# Patient Record
Sex: FEMALE | Race: WHITE | HISPANIC OR LATINO | Employment: UNEMPLOYED | URBAN - METROPOLITAN AREA
[De-identification: names, ages, dates, MRNs, and addresses within clinical notes are randomized per-mention and may not be internally consistent; named-entity substitution may affect disease eponyms.]

---

## 2021-12-29 ENCOUNTER — APPOINTMENT (OUTPATIENT)
Dept: RADIOLOGY | Facility: MEDICAL CENTER | Age: 56
DRG: 177 | End: 2021-12-29
Attending: EMERGENCY MEDICINE
Payer: OTHER GOVERNMENT

## 2021-12-29 ENCOUNTER — HOSPITAL ENCOUNTER (INPATIENT)
Facility: MEDICAL CENTER | Age: 56
LOS: 3 days | DRG: 177 | End: 2022-01-01
Attending: EMERGENCY MEDICINE | Admitting: HOSPITALIST
Payer: OTHER GOVERNMENT

## 2021-12-29 DIAGNOSIS — R06.02 SHORTNESS OF BREATH: ICD-10-CM

## 2021-12-29 DIAGNOSIS — I26.99 OTHER ACUTE PULMONARY EMBOLISM, UNSPECIFIED WHETHER ACUTE COR PULMONALE PRESENT (HCC): ICD-10-CM

## 2021-12-29 DIAGNOSIS — U07.1 PNEUMONIA DUE TO COVID-19 VIRUS: ICD-10-CM

## 2021-12-29 DIAGNOSIS — J96.01 ACUTE RESPIRATORY FAILURE WITH HYPOXIA (HCC): ICD-10-CM

## 2021-12-29 DIAGNOSIS — J12.82 PNEUMONIA DUE TO COVID-19 VIRUS: ICD-10-CM

## 2021-12-29 DIAGNOSIS — N64.89 BREAST HEMATOMA: ICD-10-CM

## 2021-12-29 PROBLEM — R73.09 ELEVATED GLUCOSE: Status: ACTIVE | Noted: 2021-12-29

## 2021-12-29 PROBLEM — Z98.890 HISTORY OF COSMETIC SURGERY: Status: ACTIVE | Noted: 2021-12-29

## 2021-12-29 LAB
ALBUMIN SERPL BCP-MCNC: 3.7 G/DL (ref 3.2–4.9)
ALBUMIN/GLOB SERPL: 1.1 G/DL
ALP SERPL-CCNC: 62 U/L (ref 30–99)
ALT SERPL-CCNC: 28 U/L (ref 2–50)
ANION GAP SERPL CALC-SCNC: 11 MMOL/L (ref 7–16)
AST SERPL-CCNC: 31 U/L (ref 12–45)
BASOPHILS # BLD AUTO: 1.2 % (ref 0–1.8)
BASOPHILS # BLD: 0.07 K/UL (ref 0–0.12)
BILIRUB SERPL-MCNC: 0.4 MG/DL (ref 0.1–1.5)
BLOOD CULTURE HOLD CXBCH: NORMAL
BUN SERPL-MCNC: 14 MG/DL (ref 8–22)
CALCIUM SERPL-MCNC: 9 MG/DL (ref 8.5–10.5)
CHLORIDE SERPL-SCNC: 102 MMOL/L (ref 96–112)
CO2 SERPL-SCNC: 24 MMOL/L (ref 20–33)
CREAT SERPL-MCNC: 0.83 MG/DL (ref 0.5–1.4)
D DIMER PPP IA.FEU-MCNC: 8.01 UG/ML (FEU) (ref 0–0.5)
EKG IMPRESSION: NORMAL
EOSINOPHIL # BLD AUTO: 0.07 K/UL (ref 0–0.51)
EOSINOPHIL NFR BLD: 1.2 % (ref 0–6.9)
ERYTHROCYTE [DISTWIDTH] IN BLOOD BY AUTOMATED COUNT: 51.5 FL (ref 35.9–50)
FLUAV RNA SPEC QL NAA+PROBE: NEGATIVE
FLUBV RNA SPEC QL NAA+PROBE: NEGATIVE
GLOBULIN SER CALC-MCNC: 3.5 G/DL (ref 1.9–3.5)
GLUCOSE SERPL-MCNC: 119 MG/DL (ref 65–99)
HCT VFR BLD AUTO: 29.4 % (ref 37–47)
HGB BLD-MCNC: 9.1 G/DL (ref 12–16)
IMM GRANULOCYTES # BLD AUTO: 0.06 K/UL (ref 0–0.11)
IMM GRANULOCYTES NFR BLD AUTO: 1 % (ref 0–0.9)
LACTATE BLD-SCNC: 1.3 MMOL/L (ref 0.5–2)
LACTATE BLD-SCNC: 1.6 MMOL/L (ref 0.5–2)
LYMPHOCYTES # BLD AUTO: 0.87 K/UL (ref 1–4.8)
LYMPHOCYTES NFR BLD: 14.6 % (ref 22–41)
MCH RBC QN AUTO: 27.7 PG (ref 27–33)
MCHC RBC AUTO-ENTMCNC: 31 G/DL (ref 33.6–35)
MCV RBC AUTO: 89.4 FL (ref 81.4–97.8)
MONOCYTES # BLD AUTO: 0.6 K/UL (ref 0–0.85)
MONOCYTES NFR BLD AUTO: 10.1 % (ref 0–13.4)
NEUTROPHILS # BLD AUTO: 4.3 K/UL (ref 2–7.15)
NEUTROPHILS NFR BLD: 71.9 % (ref 44–72)
NRBC # BLD AUTO: 0.02 K/UL
NRBC BLD-RTO: 0.3 /100 WBC
NT-PROBNP SERPL IA-MCNC: 210 PG/ML (ref 0–125)
PLATELET # BLD AUTO: 294 K/UL (ref 164–446)
PMV BLD AUTO: 8.8 FL (ref 9–12.9)
POTASSIUM SERPL-SCNC: 4.5 MMOL/L (ref 3.6–5.5)
PROT SERPL-MCNC: 7.2 G/DL (ref 6–8.2)
RBC # BLD AUTO: 3.29 M/UL (ref 4.2–5.4)
RSV RNA SPEC QL NAA+PROBE: NEGATIVE
SARS-COV-2 RNA RESP QL NAA+PROBE: DETECTED
SODIUM SERPL-SCNC: 137 MMOL/L (ref 135–145)
SPECIMEN SOURCE: ABNORMAL
TROPONIN T SERPL-MCNC: 19 NG/L (ref 6–19)
WBC # BLD AUTO: 6 K/UL (ref 4.8–10.8)

## 2021-12-29 PROCEDURE — 99223 1ST HOSP IP/OBS HIGH 75: CPT | Performed by: HOSPITALIST

## 2021-12-29 PROCEDURE — 71045 X-RAY EXAM CHEST 1 VIEW: CPT

## 2021-12-29 PROCEDURE — 80053 COMPREHEN METABOLIC PANEL: CPT

## 2021-12-29 PROCEDURE — 0241U HCHG SARS-COV-2 COVID-19 NFCT DS RESP RNA 4 TRGT MIC: CPT

## 2021-12-29 PROCEDURE — 85379 FIBRIN DEGRADATION QUANT: CPT

## 2021-12-29 PROCEDURE — 83880 ASSAY OF NATRIURETIC PEPTIDE: CPT

## 2021-12-29 PROCEDURE — 83605 ASSAY OF LACTIC ACID: CPT | Mod: 91

## 2021-12-29 PROCEDURE — 85025 COMPLETE CBC W/AUTO DIFF WBC: CPT

## 2021-12-29 PROCEDURE — 700117 HCHG RX CONTRAST REV CODE 255: Performed by: EMERGENCY MEDICINE

## 2021-12-29 PROCEDURE — C9803 HOPD COVID-19 SPEC COLLECT: HCPCS | Performed by: EMERGENCY MEDICINE

## 2021-12-29 PROCEDURE — 36415 COLL VENOUS BLD VENIPUNCTURE: CPT

## 2021-12-29 PROCEDURE — 93005 ELECTROCARDIOGRAM TRACING: CPT | Performed by: EMERGENCY MEDICINE

## 2021-12-29 PROCEDURE — 770020 HCHG ROOM/CARE - TELE (206)

## 2021-12-29 PROCEDURE — 87040 BLOOD CULTURE FOR BACTERIA: CPT

## 2021-12-29 PROCEDURE — 71275 CT ANGIOGRAPHY CHEST: CPT

## 2021-12-29 PROCEDURE — 84145 PROCALCITONIN (PCT): CPT

## 2021-12-29 PROCEDURE — 99285 EMERGENCY DEPT VISIT HI MDM: CPT

## 2021-12-29 PROCEDURE — 85610 PROTHROMBIN TIME: CPT

## 2021-12-29 PROCEDURE — 86140 C-REACTIVE PROTEIN: CPT

## 2021-12-29 PROCEDURE — 84484 ASSAY OF TROPONIN QUANT: CPT

## 2021-12-29 RX ORDER — CEPHALEXIN 500 MG/1
500 CAPSULE ORAL 4 TIMES DAILY
COMMUNITY

## 2021-12-29 RX ORDER — AMOXICILLIN 250 MG
2 CAPSULE ORAL 2 TIMES DAILY
Status: DISCONTINUED | OUTPATIENT
Start: 2021-12-29 | End: 2022-01-01 | Stop reason: HOSPADM

## 2021-12-29 RX ORDER — LISINOPRIL 10 MG/1
10 TABLET ORAL DAILY
COMMUNITY

## 2021-12-29 RX ORDER — CEFTRIAXONE 2 G/1
2 INJECTION, POWDER, FOR SOLUTION INTRAMUSCULAR; INTRAVENOUS ONCE
Status: COMPLETED | OUTPATIENT
Start: 2021-12-29 | End: 2021-12-30

## 2021-12-29 RX ORDER — ACETAMINOPHEN 500 MG
1000 TABLET ORAL
COMMUNITY

## 2021-12-29 RX ORDER — ONDANSETRON 2 MG/ML
4 INJECTION INTRAMUSCULAR; INTRAVENOUS EVERY 4 HOURS PRN
Status: DISCONTINUED | OUTPATIENT
Start: 2021-12-29 | End: 2022-01-01 | Stop reason: HOSPADM

## 2021-12-29 RX ORDER — ONDANSETRON 4 MG/1
4 TABLET, ORALLY DISINTEGRATING ORAL EVERY 4 HOURS PRN
Status: DISCONTINUED | OUTPATIENT
Start: 2021-12-29 | End: 2022-01-01 | Stop reason: HOSPADM

## 2021-12-29 RX ORDER — BISACODYL 10 MG
10 SUPPOSITORY, RECTAL RECTAL
Status: DISCONTINUED | OUTPATIENT
Start: 2021-12-29 | End: 2022-01-01 | Stop reason: HOSPADM

## 2021-12-29 RX ORDER — ACETAMINOPHEN 325 MG/1
650 TABLET ORAL EVERY 6 HOURS PRN
Status: DISCONTINUED | OUTPATIENT
Start: 2021-12-29 | End: 2022-01-01 | Stop reason: HOSPADM

## 2021-12-29 RX ORDER — HEPARIN SODIUM 1000 [USP'U]/ML
80 INJECTION, SOLUTION INTRAVENOUS; SUBCUTANEOUS ONCE
Status: COMPLETED | OUTPATIENT
Start: 2021-12-30 | End: 2021-12-30

## 2021-12-29 RX ORDER — IBUPROFEN 800 MG/1
800 TABLET ORAL 2 TIMES DAILY PRN
COMMUNITY

## 2021-12-29 RX ORDER — HEPARIN SODIUM 1000 [USP'U]/ML
40 INJECTION, SOLUTION INTRAVENOUS; SUBCUTANEOUS PRN
Status: DISCONTINUED | OUTPATIENT
Start: 2021-12-29 | End: 2022-01-01

## 2021-12-29 RX ORDER — HEPARIN SODIUM 5000 [USP'U]/100ML
0-30 INJECTION, SOLUTION INTRAVENOUS CONTINUOUS
Status: DISCONTINUED | OUTPATIENT
Start: 2021-12-30 | End: 2022-01-01

## 2021-12-29 RX ORDER — DEXAMETHASONE SODIUM PHOSPHATE 4 MG/ML
6 INJECTION, SOLUTION INTRA-ARTICULAR; INTRALESIONAL; INTRAMUSCULAR; INTRAVENOUS; SOFT TISSUE DAILY
Status: DISCONTINUED | OUTPATIENT
Start: 2021-12-30 | End: 2022-01-01 | Stop reason: HOSPADM

## 2021-12-29 RX ORDER — MORPHINE SULFATE 4 MG/ML
2 INJECTION INTRAVENOUS
Status: DISCONTINUED | OUTPATIENT
Start: 2021-12-29 | End: 2022-01-01 | Stop reason: HOSPADM

## 2021-12-29 RX ORDER — LISINOPRIL 10 MG/1
10 TABLET ORAL DAILY
Status: DISCONTINUED | OUTPATIENT
Start: 2021-12-30 | End: 2022-01-01 | Stop reason: HOSPADM

## 2021-12-29 RX ORDER — POLYETHYLENE GLYCOL 3350 17 G/17G
1 POWDER, FOR SOLUTION ORAL
Status: DISCONTINUED | OUTPATIENT
Start: 2021-12-29 | End: 2022-01-01 | Stop reason: HOSPADM

## 2021-12-29 RX ADMIN — IOHEXOL 40 ML: 350 INJECTION, SOLUTION INTRAVENOUS at 22:10

## 2021-12-29 ASSESSMENT — ENCOUNTER SYMPTOMS
NAUSEA: 0
DIZZINESS: 0
VOMITING: 0
BRUISES/BLEEDS EASILY: 0
FEVER: 0
DOUBLE VISION: 0
COUGH: 1
MYALGIAS: 0
SHORTNESS OF BREATH: 1
HEADACHES: 0
BLURRED VISION: 0
DEPRESSION: 0
SPUTUM PRODUCTION: 1
SORE THROAT: 0
WEAKNESS: 0
PALPITATIONS: 0
CHILLS: 1
NECK PAIN: 0
INSOMNIA: 0

## 2021-12-30 ENCOUNTER — APPOINTMENT (OUTPATIENT)
Dept: RADIOLOGY | Facility: MEDICAL CENTER | Age: 56
DRG: 177 | End: 2021-12-30
Attending: STUDENT IN AN ORGANIZED HEALTH CARE EDUCATION/TRAINING PROGRAM
Payer: OTHER GOVERNMENT

## 2021-12-30 ENCOUNTER — APPOINTMENT (OUTPATIENT)
Dept: CARDIOLOGY | Facility: MEDICAL CENTER | Age: 56
DRG: 177 | End: 2021-12-30
Attending: HOSPITALIST
Payer: OTHER GOVERNMENT

## 2021-12-30 PROBLEM — R09.02 HYPOXIA: Status: ACTIVE | Noted: 2021-12-30

## 2021-12-30 PROBLEM — I10 HTN (HYPERTENSION): Status: ACTIVE | Noted: 2021-12-30

## 2021-12-30 PROBLEM — E11.9 TYPE II DIABETES MELLITUS (HCC): Status: ACTIVE | Noted: 2021-12-30

## 2021-12-30 PROBLEM — R91.8 MULTIPLE LUNG NODULES ON CT: Status: ACTIVE | Noted: 2021-12-30

## 2021-12-30 LAB
ANION GAP SERPL CALC-SCNC: 9 MMOL/L (ref 7–16)
APTT PPP: 33.7 SEC (ref 24.7–36)
BUN SERPL-MCNC: 11 MG/DL (ref 8–22)
CALCIUM SERPL-MCNC: 8.6 MG/DL (ref 8.5–10.5)
CHLORIDE SERPL-SCNC: 102 MMOL/L (ref 96–112)
CO2 SERPL-SCNC: 24 MMOL/L (ref 20–33)
CREAT SERPL-MCNC: 0.56 MG/DL (ref 0.5–1.4)
CRP SERPL HS-MCNC: 14.59 MG/DL (ref 0–0.75)
ERYTHROCYTE [DISTWIDTH] IN BLOOD BY AUTOMATED COUNT: 49.5 FL (ref 35.9–50)
GLUCOSE BLD-MCNC: 121 MG/DL (ref 65–99)
GLUCOSE BLD-MCNC: 177 MG/DL (ref 65–99)
GLUCOSE BLD-MCNC: 247 MG/DL (ref 65–99)
GLUCOSE BLD-MCNC: 79 MG/DL (ref 65–99)
GLUCOSE SERPL-MCNC: 95 MG/DL (ref 65–99)
HCT VFR BLD AUTO: 24.7 % (ref 37–47)
HGB BLD-MCNC: 8 G/DL (ref 12–16)
INR PPP: 1.02 (ref 0.87–1.13)
LV EJECT FRACT  99904: 65
MCH RBC QN AUTO: 28.6 PG (ref 27–33)
MCHC RBC AUTO-ENTMCNC: 32.4 G/DL (ref 33.6–35)
MCV RBC AUTO: 88.2 FL (ref 81.4–97.8)
PLATELET # BLD AUTO: 288 K/UL (ref 164–446)
PMV BLD AUTO: 9.2 FL (ref 9–12.9)
POTASSIUM SERPL-SCNC: 3.9 MMOL/L (ref 3.6–5.5)
PROCALCITONIN SERPL-MCNC: <0.05 NG/ML
PROTHROMBIN TIME: 13.1 SEC (ref 12–14.6)
RBC # BLD AUTO: 2.8 M/UL (ref 4.2–5.4)
SODIUM SERPL-SCNC: 135 MMOL/L (ref 135–145)
UFH PPP CHRO-ACNC: 0.34 IU/ML
UFH PPP CHRO-ACNC: 0.49 IU/ML
UFH PPP CHRO-ACNC: 0.58 IU/ML
UFH PPP CHRO-ACNC: <0.1 IU/ML
WBC # BLD AUTO: 4.2 K/UL (ref 4.8–10.8)

## 2021-12-30 PROCEDURE — 96365 THER/PROPH/DIAG IV INF INIT: CPT

## 2021-12-30 PROCEDURE — 96375 TX/PRO/DX INJ NEW DRUG ADDON: CPT

## 2021-12-30 PROCEDURE — 85730 THROMBOPLASTIN TIME PARTIAL: CPT

## 2021-12-30 PROCEDURE — 85520 HEPARIN ASSAY: CPT

## 2021-12-30 PROCEDURE — 700105 HCHG RX REV CODE 258: Performed by: HOSPITALIST

## 2021-12-30 PROCEDURE — 82962 GLUCOSE BLOOD TEST: CPT | Mod: 91

## 2021-12-30 PROCEDURE — 93321 DOPPLER ECHO F-UP/LMTD STD: CPT | Mod: 26 | Performed by: INTERNAL MEDICINE

## 2021-12-30 PROCEDURE — 700102 HCHG RX REV CODE 250 W/ 637 OVERRIDE(OP): Performed by: HOSPITALIST

## 2021-12-30 PROCEDURE — 700111 HCHG RX REV CODE 636 W/ 250 OVERRIDE (IP): Performed by: EMERGENCY MEDICINE

## 2021-12-30 PROCEDURE — 96366 THER/PROPH/DIAG IV INF ADDON: CPT

## 2021-12-30 PROCEDURE — 93970 EXTREMITY STUDY: CPT

## 2021-12-30 PROCEDURE — 99233 SBSQ HOSP IP/OBS HIGH 50: CPT | Performed by: STUDENT IN AN ORGANIZED HEALTH CARE EDUCATION/TRAINING PROGRAM

## 2021-12-30 PROCEDURE — 700111 HCHG RX REV CODE 636 W/ 250 OVERRIDE (IP): Performed by: HOSPITALIST

## 2021-12-30 PROCEDURE — 700117 HCHG RX CONTRAST REV CODE 255: Performed by: HOSPITALIST

## 2021-12-30 PROCEDURE — 36415 COLL VENOUS BLD VENIPUNCTURE: CPT

## 2021-12-30 PROCEDURE — 96368 THER/DIAG CONCURRENT INF: CPT

## 2021-12-30 PROCEDURE — A9270 NON-COVERED ITEM OR SERVICE: HCPCS | Performed by: HOSPITALIST

## 2021-12-30 PROCEDURE — 93308 TTE F-UP OR LMTD: CPT | Mod: 26 | Performed by: INTERNAL MEDICINE

## 2021-12-30 PROCEDURE — 85027 COMPLETE CBC AUTOMATED: CPT

## 2021-12-30 PROCEDURE — 93308 TTE F-UP OR LMTD: CPT

## 2021-12-30 PROCEDURE — 96372 THER/PROPH/DIAG INJ SC/IM: CPT

## 2021-12-30 PROCEDURE — 770020 HCHG ROOM/CARE - TELE (206)

## 2021-12-30 PROCEDURE — 93970 EXTREMITY STUDY: CPT | Mod: 26 | Performed by: INTERNAL MEDICINE

## 2021-12-30 PROCEDURE — 80048 BASIC METABOLIC PNL TOTAL CA: CPT

## 2021-12-30 RX ORDER — DEXTROSE MONOHYDRATE 25 G/50ML
50 INJECTION, SOLUTION INTRAVENOUS
Status: DISCONTINUED | OUTPATIENT
Start: 2021-12-30 | End: 2022-01-01 | Stop reason: HOSPADM

## 2021-12-30 RX ADMIN — CEFTRIAXONE SODIUM 2 G: 2 INJECTION, POWDER, FOR SOLUTION INTRAMUSCULAR; INTRAVENOUS at 01:06

## 2021-12-30 RX ADMIN — VANCOMYCIN HYDROCHLORIDE 1500 MG: 5 INJECTION, POWDER, LYOPHILIZED, FOR SOLUTION INTRAVENOUS at 01:18

## 2021-12-30 RX ADMIN — INSULIN HUMAN 6 UNITS: 100 INJECTION, SOLUTION PARENTERAL at 14:10

## 2021-12-30 RX ADMIN — HEPARIN SODIUM 4100 UNITS: 1000 INJECTION, SOLUTION INTRAVENOUS; SUBCUTANEOUS at 02:21

## 2021-12-30 RX ADMIN — INSULIN HUMAN 2 UNITS: 100 INJECTION, SOLUTION PARENTERAL at 17:51

## 2021-12-30 RX ADMIN — HEPARIN SODIUM 18 UNITS/KG/HR: 5000 INJECTION, SOLUTION INTRAVENOUS at 02:23

## 2021-12-30 RX ADMIN — LISINOPRIL 10 MG: 10 TABLET ORAL at 05:14

## 2021-12-30 RX ADMIN — HUMAN ALBUMIN MICROSPHERES AND PERFLUTREN 3 ML: 10; .22 INJECTION, SOLUTION INTRAVENOUS at 16:29

## 2021-12-30 RX ADMIN — DEXAMETHASONE SODIUM PHOSPHATE 6 MG: 4 INJECTION, SOLUTION INTRA-ARTICULAR; INTRALESIONAL; INTRAMUSCULAR; INTRAVENOUS; SOFT TISSUE at 05:14

## 2021-12-30 ASSESSMENT — LIFESTYLE VARIABLES
ON A TYPICAL DAY WHEN YOU DRINK ALCOHOL HOW MANY DRINKS DO YOU HAVE: 0
HAVE YOU EVER FELT YOU SHOULD CUT DOWN ON YOUR DRINKING: NO
HOW MANY TIMES IN THE PAST YEAR HAVE YOU HAD 5 OR MORE DRINKS IN A DAY: 0
AVERAGE NUMBER OF DAYS PER WEEK YOU HAVE A DRINK CONTAINING ALCOHOL: 0
TOTAL SCORE: 0
TOTAL SCORE: 0
DOES PATIENT WANT TO STOP DRINKING: NO
TOTAL SCORE: 0
EVER HAD A DRINK FIRST THING IN THE MORNING TO STEADY YOUR NERVES TO GET RID OF A HANGOVER: NO
HAVE PEOPLE ANNOYED YOU BY CRITICIZING YOUR DRINKING: NO
EVER FELT BAD OR GUILTY ABOUT YOUR DRINKING: NO
CONSUMPTION TOTAL: NEGATIVE
EVER_SMOKED: NEVER
ALCOHOL_USE: NO

## 2021-12-30 ASSESSMENT — COGNITIVE AND FUNCTIONAL STATUS - GENERAL
SUGGESTED CMS G CODE MODIFIER DAILY ACTIVITY: CH
SUGGESTED CMS G CODE MODIFIER MOBILITY: CI
MOBILITY SCORE: 23
CLIMB 3 TO 5 STEPS WITH RAILING: A LITTLE
DAILY ACTIVITIY SCORE: 24

## 2021-12-30 ASSESSMENT — COPD QUESTIONNAIRES
DO YOU EVER COUGH UP ANY MUCUS OR PHLEGM?: NO/ONLY WITH OCCASIONAL COLDS OR INFECTIONS
DURING THE PAST 4 WEEKS HOW MUCH DID YOU FEEL SHORT OF BREATH: NONE/LITTLE OF THE TIME
HAVE YOU SMOKED AT LEAST 100 CIGARETTES IN YOUR ENTIRE LIFE: NO/DON'T KNOW
COPD SCREENING SCORE: 2

## 2021-12-30 ASSESSMENT — ENCOUNTER SYMPTOMS
FEVER: 0
ABDOMINAL PAIN: 1
MYALGIAS: 0
SHORTNESS OF BREATH: 1
CHILLS: 1

## 2021-12-30 ASSESSMENT — PATIENT HEALTH QUESTIONNAIRE - PHQ9
2. FEELING DOWN, DEPRESSED, IRRITABLE, OR HOPELESS: NOT AT ALL
1. LITTLE INTEREST OR PLEASURE IN DOING THINGS: NOT AT ALL
SUM OF ALL RESPONSES TO PHQ9 QUESTIONS 1 AND 2: 0

## 2021-12-30 ASSESSMENT — PAIN DESCRIPTION - PAIN TYPE: TYPE: ACUTE PAIN

## 2021-12-30 NOTE — ED NOTES
Collected labs; placed PIV.  Rounded on Pt.    Updated Pt on plan of care. Pt verbalized understanding.  No acute distress at this time.  Will continue to monitor.

## 2021-12-30 NOTE — PROGRESS NOTES
Pharmacy Vancomycin Kinetics Note for 12/30/2021     66 y.o. female on Vancomycin day # 1     Vancomycin Indication (AUC Dosing): Skin/skin structure infection    Provider specified end date: 01/08/22    Active Antibiotics (From admission, onward)    Ordered     Ordering Provider       Thu Dec 30, 2021 12:45 AM    12/30/21 0045  vancomycin (VANCOCIN) 1,000 mg in  mL IVPB  (vancomycin (VANCOCIN) IV (LD + Maintenance))  EVERY 24 HOURS         Kenyatta Deng M.D.       Thu Dec 30, 2021 12:24 AM    12/30/21 0024  vancomycin (VANCOCIN) 1,500 mg in  mL IVPB  (vancomycin (VANCOCIN) IV (LD + Maintenance))  ONCE         Kenyatta Deng M.D.       Wed Dec 29, 2021 11:27 PM    12/29/21 2327  MD Alert...Vancomycin per Pharmacy  PHARMACY TO DOSE        Question:  Indication(s) for vancomycin?  Answer:  Skin and soft tissue infection    Kenyatta Deng M.D.       Wed Dec 29, 2021 11:23 PM    12/29/21 2323  cefTRIAXone (Rocephin) injection 2 g  ONCE         Taj Lutz M.D.          Dosing Weight: 61.2 kg (134 lb 14.7 oz)      Admission History: Admitted on 12/29/2021 for Pneumonia due to COVID-19 virus [U07.1, J12.82]  Pertinent history: Pt presents with SOB following breast augmentation and tummy tuck performed on 12/10 in [place]. ERP notes blood oozing from breast incision as well as black flap of skin above incision on abdomen. MD opting for empiric IV antibiotics given possibility that infection extends to newly placed implant. Plastic surgery consulted for further examination and recommendations. Lactic acid, PCT, WBCs WNL. Afebrile. CRP elevated.    Allergies:     Penicillin g     Pertinent cultures to date:     Results     Procedure Component Value Units Date/Time    MRSA By PCR (Amp) [[id number]]     Order Status: Sent Specimen: Respirate from Nares     BLOOD CULTURE [[id number]] Collected: 12/29/21 3557    Order Status: Completed Specimen: Blood from Peripheral Updated: 12/30/21  "0113    Narrative:      Collected By:  Per Hospital Policy: Only change Specimen Src: to \"Line\" if  specified by physician order.    Blood Culture,Hold [[id number]] Collected: 12/29/21 2045    Order Status: Completed Updated: 12/29/21 2327     Blood Culture Hold Collected    COV-2, FLU A/B, AND RSV BY PCR (2-4 HOURS CEPHEID): Collect NP swab in VTM [[id number]]  (Abnormal) Collected: 12/29/21 2053    Order Status: Completed Specimen: Respirate Updated: 12/29/21 2205     Influenza virus A RNA Negative     Influenza virus B, PCR Negative     RSV, PCR Negative     SARS-CoV-2 by PCR DETECTED     Comment: PATIENTS: Important information regarding your results and instructions can  be found at [email or web address]   \"After your  Covid-19 Test\"    **The [employer] GeneXpert Xpress SARS-CoV-2 RT-PCR Test has been made  available for use under the Emergency Use Authorization (EUA) only.          SARS-CoV-2 Source NP Swab    Narrative:      Collected By:  Have you been in close contact with a person who is suspected  or known to be positive for COVID-19 within the last 30 days  (e.g. last seen that person < 30 days ago)->No    BLOOD CULTURE [[id number]] Collected: 12/29/21 1732    Order Status: Completed Specimen: Blood from Peripheral Updated: 12/29/21 1756    Narrative:      Collected By:  Per Hospital Policy: Only change Specimen Src: to \"Line\" if  specified by physician order.    URINALYSIS [[id number]]     Order Status: Sent Specimen: Urine     URINE CULTURE(NEW) [[id number]]     Order Status: Sent Specimen: Urine           Labs:     Estimated Creatinine Clearance: 55.3 mL/min (by C-G formula based on SCr of 0.83 mg/dL).  Recent Labs     12/29/21 1732   WBC 6.0   NEUTSPOLYS 71.90     Recent Labs     12/29/21 1732   BUN 14   CREATININE 0.83   ALBUMIN 3.7     No intake or output data in the 24 hours ending 12/30/21 0116   /73   Pulse 94   Temp 37.6 °C (99.7 °F) (Temporal)   Resp 20   Ht " 1.524 m (5')   Wt 61.2 kg (135 lb)   SpO2 95%  Temp (24hrs), Av.6 °C (99.7 °F), Min:37.6 °C (99.7 °F), Max:37.6 °C (99.7 °F)      List concerns for Vancomycin clearance:     Receipt of contrast dye    Pharmacokinetics:     AUC kinetics:   Ke (hr ^-1): 0.0503 hr^-1  Half life: 13.78 hr  Clearance: 2.001  Estimated TDD: 1000.5  Estimated Dose: 659  Estimated interval: 15.8      A/P:     -  Vancomycin dose: 1000 mg q24h    -  Next vancomycin level(s):    - Will be ordered by floor pharmacist if necessary    -  Predicted vancomycin AUC from initial AUC test calculator: 500 mg·hr/L    -  Comments: vancomycin initiated for SSTI of breast after recent surgery. Little concern for accumulation. Pharmacy will continue to follow.    Ronald Hauser, PharmD

## 2021-12-30 NOTE — ED PROVIDER NOTES
ED Provider Note    Scribed for Taj Lutz M.D. by Naif Plascencia. 12/29/2021, 7:35 PM.    Primary care provider: No primary care provider.  Means of arrival: Walk-in  History obtained from: Patient  History limited by: None    CHIEF COMPLAINT  Chief Complaint   Patient presents with   • Shortness of Breath     since after surgery 12/10       HPI  Sammie Dc is a 66 y.o. female who presents to the Emergency Department for evaluation of shortness of breath onset 3 weeks ago. She had cosmetic surgery of her breasts and abdomen on 12/10 performed in [place]. Her shortness of breath started right after he surgery. Her daughter mentions that she slept for the first three days after surgery, and afterward, her shortness of breath continued to worsen. She states that it is not as bad now because she is on oxygen.       She adds that she took off her bra when she got here, and blood was leaking out of her breast.  He states there was a gush of blood under the incision near her right breast.  And there is still bleeding.  She also adds that there is a flap of skin around her abdominal incision that is black. She has associated cough, and sputum production.       She denies any swelling or pain in the legs, or chest pain. No alleviating or exacerbating factors noted. She has a history of hypertension. She is allergic to penicillins.  Penicillins caused a rash.  And swelling.  Last penicillin is 25 years ago.  No history of heart disease or venous thromboembolic disease.    REVIEW OF SYSTEMS  Review of Systems   Respiratory: Positive for cough, sputum production and shortness of breath.    Cardiovascular: Negative for chest pain and leg swelling.        Negative for pain in the legs.   All other systems reviewed and are negative.      PAST MEDICAL HISTORY     Diabetes, high blood pressure      SURGICAL HISTORY  patient denies any surgical history  Recent breast augmentation and tummy  ramez.      SOCIAL HISTORY  Social History     Tobacco Use   • Smoking status: Not noted when reviewed   • Smokeless tobacco: Not noted when reviewed   Substance Use Topics   • Alcohol use: Not noted when reviewed   • Drug use: Not noted when reviewed      Social History     Substance and Sexual Activity   Drug Use Not noted when reviewed       FAMILY HISTORY  No family history pertinent.    CURRENT MEDICATIONS  Metformin, Prinivil, Keflex and ibuprofen        ALLERGIES  Allergies   Allergen Reactions   • Penicillin G      Rash and sob       PHYSICAL EXAM  VITAL SIGNS: /79   Pulse (!) 118   Temp 37.6 °C (99.7 °F) (Temporal)   Resp 14   Ht 1.524 m (5')   Wt 61.2 kg (135 lb)   SpO2 93% Comment: 87% room air  BMI 26.37 kg/m²   Vitals reviewed.  Constitutional: Well developed, Well nourished, No acute distress, Non-toxic appearance.   HENT: Normocephalic, Atraumatic  Eyes: PERRL, EOMI, Conjunctiva normal, No discharge.   Neck: Normal range of motion, No tenderness, Supple, No stridor.   Cardiovascular: Normal heart rate, Normal rhythm, No murmurs, No rubs, No gallops.   Thorax: Left breast has a well-healed incision.  Right breast is edematous, from the nipple there is a small area of oozing old blood.  There is induration of the right breast.  Lower abdominal incision is clean dry intact except a small amount of central necrosis.  Mildly indurated not red or hot.  Thorax & Lungs: Normal breath sounds, No respiratory distress, No wheezing  Abdomen: Bowel sounds normal, Soft, No tenderness  Skin: Warm, Dry, No erythema, No rash.   Musculoskeletal: Good range of motion in all major joints. No edema.   Neurologic: Alert, No focal deficits noted.   Psychiatric: Affect normal    LABS  Results for orders placed or performed during the hospital encounter of 12/29/21   Lactic acid (lactate)   Result Value Ref Range    Lactic Acid 1.6 0.5 - 2.0 mmol/L   Lactic acid (lactate): Repeat if initial lactic acid result is  greater than 2   Result Value Ref Range    Lactic Acid 1.3 0.5 - 2.0 mmol/L   CBC WITH DIFFERENTIAL   Result Value Ref Range    WBC 6.0 4.8 - 10.8 K/uL    RBC 3.29 (L) 4.20 - 5.40 M/uL    Hemoglobin 9.1 (L) 12.0 - 16.0 g/dL    Hematocrit 29.4 (L) 37.0 - 47.0 %    MCV 89.4 81.4 - 97.8 fL    MCH 27.7 27.0 - 33.0 pg    MCHC 31.0 (L) 33.6 - 35.0 g/dL    RDW 51.5 (H) 35.9 - 50.0 fL    Platelet Count 294 164 - 446 K/uL    MPV 8.8 (L) 9.0 - 12.9 fL    Neutrophils-Polys 71.90 44.00 - 72.00 %    Lymphocytes 14.60 (L) 22.00 - 41.00 %    Monocytes 10.10 0.00 - 13.40 %    Eosinophils 1.20 0.00 - 6.90 %    Basophils 1.20 0.00 - 1.80 %    Immature Granulocytes 1.00 (H) 0.00 - 0.90 %    Nucleated RBC 0.30 /100 WBC    Neutrophils (Absolute) 4.30 2.00 - 7.15 K/uL    Lymphs (Absolute) 0.87 (L) 1.00 - 4.80 K/uL    Monos (Absolute) 0.60 0.00 - 0.85 K/uL    Eos (Absolute) 0.07 0.00 - 0.51 K/uL    Baso (Absolute) 0.07 0.00 - 0.12 K/uL    Immature Granulocytes (abs) 0.06 0.00 - 0.11 K/uL    NRBC (Absolute) 0.02 K/uL   COMP METABOLIC PANEL   Result Value Ref Range    Sodium 137 135 - 145 mmol/L    Potassium 4.5 3.6 - 5.5 mmol/L    Chloride 102 96 - 112 mmol/L    Co2 24 20 - 33 mmol/L    Anion Gap 11.0 7.0 - 16.0    Glucose 119 (H) 65 - 99 mg/dL    Bun 14 8 - 22 mg/dL    Creatinine 0.83 0.50 - 1.40 mg/dL    Calcium 9.0 8.5 - 10.5 mg/dL    AST(SGOT) 31 12 - 45 U/L    ALT(SGPT) 28 2 - 50 U/L    Alkaline Phosphatase 62 30 - 99 U/L    Total Bilirubin 0.4 0.1 - 1.5 mg/dL    Albumin 3.7 3.2 - 4.9 g/dL    Total Protein 7.2 6.0 - 8.2 g/dL    Globulin 3.5 1.9 - 3.5 g/dL    A-G Ratio 1.1 g/dL   ESTIMATED GFR   Result Value Ref Range    GFR If African American >60 >60 mL/min/1.73 m 2    GFR If Non African American >60 >60 mL/min/1.73 m 2   D-DIMER   Result Value Ref Range    D-Dimer Screen 8.01 (H) 0.00 - 0.50 ug/mL (FEU)   COV-2, FLU A/B, AND RSV BY PCR (2-4 HOURS CEPHEID): Collect NP swab in VTM    Specimen: Respirate   Result Value Ref Range     Influenza virus A RNA Negative Negative    Influenza virus B, PCR Negative Negative    RSV, PCR Negative Negative    SARS-CoV-2 by PCR DETECTED (AA)     SARS-CoV-2 Source NP Swab    proBrain Natriuretic Peptide, NT   Result Value Ref Range    NT-proBNP 210 (H) 0 - 125 pg/mL   TROPONIN   Result Value Ref Range    Troponin T 19 6 - 19 ng/L   Blood Culture,Hold   Result Value Ref Range    Blood Culture Hold Collected    EKG (NOW)   Result Value Ref Range    Report       [facility]    Test Date:  2021  Pt Name:    WILLA FERNANDEZ         Department: ER  MRN:        [id number]                      Room:       [facility]  Gender:     Female                       Technician: [id number]  :        1955                   Requested By:WALKER STOCK  Order #:    [id number]                    Reading MD: WALKER STOCK. [facility]    Measurements  Intervals                                Axis  Rate:       93                           P:          27  AR:         140                          QRS:        -14  QRSD:       70                           T:          16  QT:         356  QTc:        443    Interpretive Statements  SINUS RHYTHM  VENTRICULAR PREMATURE COMPLEX  BORDERLINE R WAVE PROGRESSION, ANTERIOR LEADS  No previous ECG available for comparison  Electronically Signed On 2021 23:52:05 PST by WALKER STOCK. [facility]       All labs reviewed by me.    RADIOLOGY  CT-CTA CHEST PULMONARY ARTERY W/ RECONS   Final Result      1.  Bilateral pulmonary emboli at the main pulmonary artery branch point extending into segmental arteries. Borderline right heart strain.   2.  Left lower lobe atelectasis versus pneumonia.   3.  Small hiatal hernia.   4.  4 mm right upper and lower lobe pleural-based nodules. Follow-up recommendations below.      Low Risk: No routine follow-up      High Risk: Optional CT at 12 months      Comments: Use most suspicious nodule as guide to management. Follow-up  intervals may vary according to size and risk.      Low Risk - Minimal or absent history of smoking and of other known risk factors.      High Risk - History of smoking or of other known risk factors.      Note: These recommendations do not apply to lung cancer screening, patients with immunosuppression, or patients with known primary cancer.      Fleischner Society 2017 Guidelines for Management of Incidentally Detected Pulmonary Nodules in Adults      Dr. Olivera discussed these findings with Dr. Lutz at 11:15 PM by telephone on 12/29/2021      DX-CHEST-PORTABLE (1 VIEW)   Final Result      Left basilar opacity could be from atelectasis favored over consolidation        The radiologist's interpretation of all radiological studies have been reviewed by me.    COURSE & MEDICAL DECISION MAKING  Pertinent Labs & Imaging studies reviewed. (See chart for details)    Chart is reviewed for baseline labs and previous work-up for comparison.  No old labs for comparison are available.    7:35 PM Patient seen and examined at bedside. The patient presents with shortness of breath, and the differential diagnosis for shortness of breath includes pneumonia, Covid, pulmonary embolism, and perioperative MI.  For her oozing from her breast includes draining hematoma, versus infection or possible infected prostheses were abscess.. Ordered for DX-Chest, COV-2, Flu A/B, and RSV, D-Dimer, Troponin, proBrain natriuretic, Lactate, CBC with diff., CMP, Urinalysis, Urine culture, Blood culture, and EKG to evaluate.    Patient is return for the above differential diagnosis labs and imaging.  Labs are fairly reassuring to proceed positive for COVID.  She has a markedly elevated D-dimer.  This could be postsurgical.  Chest CT was done that she does have a PE.  She has borderline right heart strain.  Minimal elevation in her BNP normal troponin.  EKG is as above.      The patient is Covid.  She is minimally hypoxic.  She also is a pulmonary  embolism.  This is fairly significant.  She will require anticoagulation.  This is concerning because she has ongoing oozing and bleeding from her right breast.  This may be a hematoma.    He is on Keflex will give her a dose of Rocephin to prevent any progression of infection.  She may need to see the plastic surgeon.  Time for chest CT results she does not need to see a plastic surgeon tonight.  She is from out of town may require consultation with a plastic surgeon.  Should be hospitalized for further work-up and treatment.        DISPOSITION:  Patient will be hospitalized by Dr Jordan and guarded condition.    FINAL IMPRESSION  1. Shortness of breath    2. Acute respiratory failure with hypoxia (HCC)    3. Pneumonia due to COVID-19 virus    4. Other acute pulmonary embolism, unspecified whether acute cor pulmonale present (HCC)    5. Breast hematoma       Addendum was dictated for EKG results and interpretation       Naif BECKER (Scribe), am scribing for, and in the presence of, Taj Lutz M.D..    Electronically signed by: Naif Plascencia (Scribe), 12/29/2021    Taj BECKER M.D. personally performed the services described in this documentation, as scribed by Naif Plascencia in my presence, and it is both accurate and complete.    The note accurately reflects work and decisions made by me.  Taj Lutz M.D.  12/29/2021  11:31 PM    C

## 2021-12-30 NOTE — ED NOTES
"Pt presents to the ED with concern for bleeding from right breast (s/p breast surgery) and blackness above \"tummy tuck\" incision.  Pt endorses difficulty breathing (has been ongoing for 3 weeks). Difficulty breathing makes it hard for Pt to speak in full sentences.  Pt complains of left ear pain.   "

## 2021-12-30 NOTE — ED NOTES
Rounded on Pt.    Updated Pt on plan of care. Pt verbalized understanding.  No acute distress at this time.  Will continue to monitor.   right hallux ulcer, down to deep dermal tissue. does not probe to bone  pt has a chronic h/o of right hallux ulcerations   recommend xrays to r/o osteomyelitis  will f/u

## 2021-12-30 NOTE — ED NOTES
Med Rec completed per patient via ipad  ([employer] [id number]), daughter and med bottles at bedside (Returned)  Allergies reviewed

## 2021-12-30 NOTE — ASSESSMENT & PLAN NOTE
-With underlying Covid and lateral PE.  -Currently on 2 L of supplemental oxygen to maintain O2 saturations above 90%.  Patient's respiratory status is improving however on ambulation she is requiring supplemental oxygen.  Home O2 orders placed and making arrangements for the patient continue her supplemental oxygen when she discharges to [place].

## 2021-12-30 NOTE — ED NOTES
Gave patient hot meal tray per rn tiffany  rn is at bedside to check patients blood sugar before meal

## 2021-12-30 NOTE — ED TRIAGE NOTES
Chief Complaint   Patient presents with   • Shortness of Breath     since after surgery 12/10     Pt ambulated to triage, she had breast augmentation and tummy tuck 12/10 and since after surgery experiencing sob. 87% on room air, placed on 2L nc.   Sepsis score=3, protocol ordered

## 2021-12-30 NOTE — ASSESSMENT & PLAN NOTE
-Inpatient status on telemetry unit as she also has bilateral PE.  -Tested positive for Covid in the ED.  -Added CRP and procalcitonin.  -She is getting full anticoagulation with heparin.  -Added dexamethasone  12/30: on 2L

## 2021-12-30 NOTE — PROGRESS NOTES
Hospital Medicine Daily Progress Note    Date of Service  12/30/2021    Chief Complaint  Sammie Dc is a 66 y.o. female admitted 12/29/2021 with COVID and PE    Hospital Course  No notes on file    Interval Problem Update  Patient continues require 2 L of oxygen.  No overnight events.  Case was discussed with plastic surgery.  On physical examination patient does not appear infected.  Plastic surgery reports that the umbilicus does appear slightly necrotic and may need surgery in the future, but not likely at this time.  Also there is some possible necrosis at the lower abdominal mid incision site.  Plastic surgery recommending that we observe the patient while she is on anticoagulation since there is a hematoma of the right breast, which could potentially worsen.    I have personally seen and examined the patient at bedside. I discussed the plan of care with patient, family and bedside RN.    Consultants/Specialty  plastic surgery    Code Status  Full Code    Disposition  Patient is not medically cleared for discharge.   Anticipate discharge to to home with close outpatient follow-up.  I have placed the appropriate orders for post-discharge needs.    Review of Systems  Review of Systems   Constitutional: Positive for chills and malaise/fatigue. Negative for fever.   Respiratory: Positive for shortness of breath.    Cardiovascular: Negative for chest pain.   Gastrointestinal: Positive for abdominal pain.   Musculoskeletal: Negative for myalgias.   All other systems reviewed and are negative.       Physical Exam  Temp:  [36.5 °C (97.7 °F)-37.6 °C (99.7 °F)] 36.5 °C (97.7 °F)  Pulse:  [] 84  Resp:  [14-24] 20  BP: (106-132)/(55-79) 106/73  SpO2:  [93 %-98 %] 94 %    Physical Exam  Vitals and nursing note reviewed. Exam conducted with a chaperone present.   Constitutional:       General: She is not in acute distress.     Appearance: Normal appearance.   HENT:      Head: Normocephalic and  atraumatic.   Eyes:      General: No scleral icterus.        Right eye: No discharge.         Left eye: No discharge.   Cardiovascular:      Rate and Rhythm: Normal rate and regular rhythm.      Heart sounds: Normal heart sounds. No murmur heard.      Pulmonary:      Effort: Pulmonary effort is normal. No respiratory distress.      Breath sounds: No wheezing or rales.   Chest:      Comments: Breast augmentation bilaterally. Hematoma of right breast. Swelling of both breasts, normal temp. No discharge. Does not appear infected.  Abdominal:      General: There is no distension.      Palpations: Abdomen is soft.      Tenderness: There is no guarding.      Comments: Some necrosis of umbilicus and lower mid abdominal scar.   Musculoskeletal:         General: No tenderness.      Right lower leg: No edema.      Left lower leg: No edema.   Skin:     Coloration: Skin is not pale.      Findings: No lesion or rash.   Neurological:      Mental Status: She is alert and oriented to person, place, and time.      Cranial Nerves: No cranial nerve deficit.      Motor: No weakness.   Psychiatric:         Mood and Affect: Mood normal.         Behavior: Behavior normal.         Thought Content: Thought content normal.         Fluids  No intake or output data in the 24 hours ending 12/30/21 1351    Laboratory  Recent Labs     12/29/21  1732 12/30/21  0100   WBC 6.0 4.2*   RBC 3.29* 2.80*   HEMOGLOBIN 9.1* 8.0*   HEMATOCRIT 29.4* 24.7*   MCV 89.4 88.2   MCH 27.7 28.6   MCHC 31.0* 32.4*   RDW 51.5* 49.5   PLATELETCT 294 288   MPV 8.8* 9.2     Recent Labs     12/29/21  1732 12/30/21  0100   SODIUM 137 135   POTASSIUM 4.5 3.9   CHLORIDE 102 102   CO2 24 24   GLUCOSE 119* 95   BUN 14 11   CREATININE 0.83 0.56   CALCIUM 9.0 8.6     Recent Labs     12/29/21  1752 12/30/21  0100   APTT  --  33.7   INR 1.02  --                Imaging  CT-CTA CHEST PULMONARY ARTERY W/ RECONS   Final Result      1.  Bilateral pulmonary emboli at the main pulmonary  artery branch point extending into segmental arteries. Borderline right heart strain.   2.  Left lower lobe atelectasis versus pneumonia.   3.  Small hiatal hernia.   4.  4 mm right upper and lower lobe pleural-based nodules. Follow-up recommendations below.      Low Risk: No routine follow-up      High Risk: Optional CT at 12 months      Comments: Use most suspicious nodule as guide to management. Follow-up intervals may vary according to size and risk.      Low Risk - Minimal or absent history of smoking and of other known risk factors.      High Risk - History of smoking or of other known risk factors.      Note: These recommendations do not apply to lung cancer screening, patients with immunosuppression, or patients with known primary cancer.      Fleischner Society 2017 Guidelines for Management of Incidentally Detected Pulmonary Nodules in Adults      Dr. Olivera discussed these findings with Dr. Lutz at 11:15 PM by telephone on 12/29/2021      DX-CHEST-PORTABLE (1 VIEW)   Final Result      Left basilar opacity could be from atelectasis favored over consolidation      EC-ECHOCARDIOGRAM COMPLETE W/ CONT    (Results Pending)        Assessment/Plan  * Pneumonia due to COVID-19 virus- (present on admission)  Assessment & Plan  -Inpatient status on telemetry unit as she also has bilateral PE.  -Tested positive for Covid in the ED.  -Added CRP and procalcitonin.  -She is getting full anticoagulation with heparin.  -Added dexamethasone  12/30: on 2L    Multiple lung nodules on CT  Assessment & Plan  Seen on CT  Possibly due to underlying infection  Follow up as outpatient    Hypoxia  Assessment & Plan  -With underlying Covid and lateral PE.  -Currently on 2 L of supplemental oxygen to maintain O2 saturations above 90%.  -Plan as above.    HTN (hypertension)  Assessment & Plan  -Continue lisinopril.    Type II diabetes mellitus (HCC)  Assessment & Plan  -Hold metformin and start regular insulin sliding  scale.    History of cosmetic surgery  Assessment & Plan  -History of cosmetic surgery procedure of her breasts and abdomen 3 weeks ago performed by Dr. Keyes in [place].  ERP attempted to contact office but no one answered overnight.  -Patient has areas of hematoma and induration, and given that her right breast is also warmer as compared to the left, will cover with IV antibiotics, vancomycin.  She is allergic to penicillin.  -Additionally, if unable to communicate with her surgeon, she may need a nonemergency plastic surgery consult to check her wounds.  12/30: case discussed with Plastic surgery, they report here is some skin necrosis of the umbilicus and mid abdomen. They recommend that with the patients COVID and PE it is best to monitor for now for worsening of hematoma for 1-2 days. There is no active infection    Bilateral pulmonary embolism (HCC)  Assessment & Plan  -DDimer was elevated and given recent surgery  -CT angiogram of the chest  was done showing Bilateral pulmonary emboli at the main pulmonary artery branch point extending into segmental arteries.  With borderline right heart strain RV ratio 0.9  -Heparin Infusion per PE protocol  -PE Risk Stratification: Low- Intermediate Risk with PESI score of 2. Vitals signs stable  -I had echocardiogram in the morning.  Additionally also added EKG.  Dc on NOAC       VTE prophylaxis: therapeutic anticoagulation with heparin    I have performed a physical exam and reviewed and updated ROS and Plan today (12/30/2021). In review of yesterday's note (12/29/2021), there are no changes except as documented above.

## 2021-12-30 NOTE — ASSESSMENT & PLAN NOTE
-History of cosmetic surgery procedure of her breasts and abdomen 3 weeks ago performed by Dr. Keyes in [place].  ERP attempted to contact office but no one answered overnight.  -Patient has areas of hematoma and induration, and given that her right breast is also warmer as compared to the left, will cover with IV antibiotics, vancomycin.  She is allergic to penicillin.  -Additionally, if unable to communicate with her surgeon, she may need a nonemergency plastic surgery consult to check her wounds.  12/30: case discussed with Plastic surgery, they report here is some skin necrosis of the umbilicus and mid abdomen. They recommend that with the patients COVID and PE it is best to monitor for now for worsening of hematoma for 1-2 days. There is no active infection  12/31: Slight improvement in the coloration of the patient's hematoma the right breast, tolerating anticoagulation well.

## 2021-12-30 NOTE — ED NOTES
Rounded on Pt.    Updated Pt on plan of care. Pt verbalized understanding.  No acute distress at this time.  Will continue to monitor.

## 2021-12-30 NOTE — ASSESSMENT & PLAN NOTE
-DDimer was elevated and given recent surgery  -CT angiogram of the chest  was done showing Bilateral pulmonary emboli at the main pulmonary artery branch point extending into segmental arteries.  With borderline right heart strain RV ratio 0.9  -Heparin Infusion per PE protocol  -PE Risk Stratification: Low- Intermediate Risk with PESI score of 2. Vitals signs stable  -I had echocardiogram in the morning.  Additionally also added EKG.  I discharge patient will start Xarelto and understands she will need to establish with a primary care provider back in [place].  Patient is tolerating anticoagulation with no signs of bleeding.

## 2021-12-30 NOTE — ED NOTES
Rounded on Pt. Pt resting comfortably with eyes closed. Respirations regular.  Will continue to monitor.

## 2021-12-30 NOTE — ED NOTES
Medicated Pt according to MAR.  Rounded on Pt.    Updated Pt on plan of care. Pt verbalized understanding.  No acute distress at this time.  Will continue to monitor.

## 2021-12-30 NOTE — ED NOTES
Patient resting comfortably at this time. Denies any pain or discomfort and denies SOB. Call light within reach.

## 2021-12-30 NOTE — H&P
Hospital Medicine History & Physical Note    Date of Service  12/29/2021    Primary Care Physician  No primary care provider on file.    Consultants  ERP attempted to obtain consult with Plastic Surgery in [place], but was unable to talk with him    Code Status  Full Code    Chief Complaint  Chief Complaint   Patient presents with   • Shortness of Breath     since after surgery 12/10       History of Presenting Illness  Sammie Dc, is a 66 y.o. female, who is visiting from [place].  She had a recent Cosmetic Surgery procedure of her breast and abdomen on 12/10.  She presented to the ED on 12/29/2021 with shortness of breath that is started around after surgery that progressively is getting worse.  She also has associated productive cough.  Additionally patient's noticed that she had some oozing/leaking from nipple and her abdominal flap incision area also has an area of black skin.  Denies fever but does have some chills.  She also denies chest pain, bilateral leg edema.    Tested positive for Covid in the ED.  Currently on 2 L of supplemental oxygen.  Additionally tested positive for PE with bilateral pulmonary emboli at the main pulmonary artery branch extending to segmental arteries with borderline right heart strain.    I discussed the plan of care with patient.    Review of Systems  Review of Systems   Constitutional: Positive for chills. Negative for fever.   HENT: Negative for congestion and sore throat.    Eyes: Negative for blurred vision and double vision.   Respiratory: Positive for cough and shortness of breath.    Cardiovascular: Negative for chest pain and palpitations.   Gastrointestinal: Negative for nausea and vomiting.   Genitourinary: Negative for dysuria and urgency.   Musculoskeletal: Negative for myalgias and neck pain.   Skin: Positive for rash. Negative for itching.   Neurological: Negative for dizziness, weakness and headaches.   Endo/Heme/Allergies: Does not bruise/bleed  easily.   Psychiatric/Behavioral: Negative for depression. The patient does not have insomnia.        Past Medical History  HTN, DM    Surgical History  Cosmetic Surgery procedure of her breast and abdomen 3 weeks ago, Histerectomy    Family History  Reviewed and not pertinent  Family history reviewed with patient. There is no family history that is pertinent to the chief complaint.     Social History   Patient denies smoking.  She also does not drink alcohol daily.  Denies recreational drug use.    Allergies  Allergies   Allergen Reactions   • Penicillin G Shortness of Breath and Rash     Rash and sob       Medications  Prior to Admission Medications   Prescriptions Last Dose Informant Patient Reported? Taking?   Fexofenadine HCl (MUCINEX ALLERGY PO) 12/29/2021 at 1400 Patient Yes Yes   Sig: Take 2 Tablets by mouth every 6 hours as needed (Cough).   acetaminophen (TYLENOL) 500 MG Tab 12/28/2021 at am Patient Yes Yes   Sig: Take 1,000 mg by mouth 1 time a day as needed for Mild Pain.   cephALEXin (KEFLEX) 500 MG Cap 12/29/2021 at 1400 Patient Yes Yes   Sig: Take 500 mg by mouth 4 times a day. 7 day supply   ibuprofen (MOTRIN) 800 MG Tab 12/14/2021 at Stopped Patient Yes Yes   Sig: Take 800 mg by mouth 2 times a day as needed for Mild Pain.   lisinopril (PRINIVIL) 10 MG Tab 12/28/2021 at 1100 Patient Yes Yes   Sig: Take 10 mg by mouth every day.   metFORMIN (GLUCOPHAGE) 500 MG Tab 12/28/2021 at 1100 Patient Yes Yes   Sig: Take 500 mg by mouth 2 times a day with meals.      Facility-Administered Medications: None       Physical Exam  Temp:  [37.6 °C (99.7 °F)] 37.6 °C (99.7 °F)  Pulse:  [] 94  Resp:  [14-20] 20  BP: (120-132)/(63-79) 125/73  SpO2:  [93 %-97 %] 95 %  Blood Pressure : 125/73   Temperature: 37.6 °C (99.7 °F)   Pulse: 94   Respiration: 20   Pulse Oximetry: 95 %       Physical Exam  Constitutional:       Appearance: Normal appearance.   HENT:      Head: Normocephalic and atraumatic.       Mouth/Throat:      Mouth: Mucous membranes are moist.      Pharynx: Oropharynx is clear.   Eyes:      Extraocular Movements: Extraocular movements intact.      Pupils: Pupils are equal, round, and reactive to light.   Cardiovascular:      Rate and Rhythm: Regular rhythm. Tachycardia present.      Heart sounds: Normal heart sounds.   Pulmonary:      Effort: Pulmonary effort is normal.      Breath sounds: Normal breath sounds.   Abdominal:      General: Abdomen is flat. Bowel sounds are normal.      Palpations: Abdomen is soft.   Musculoskeletal:      Cervical back: Normal range of motion and neck supple.      Comments: Left breast: nipple incision is dry and clean. No underlying induration or erythema.    -Right breast is edematous, firm to touch. Nipple incision oozing minimal old blood.  Inferior aspect of breast is indurated and hotter compared to the left.   -Lower abdominal incision is clean dry intact. There is minimal/ small area of black skin central.  Mildly indurated not red or hot. She also has induration and dry blood on umbilical area   Skin:     General: Skin is warm and dry.   Neurological:      General: No focal deficit present.      Mental Status: She is alert and oriented to person, place, and time.   Psychiatric:         Mood and Affect: Mood normal.         Behavior: Behavior normal.         Laboratory:  Recent Labs     12/29/21  1732   WBC 6.0   RBC 3.29*   HEMOGLOBIN 9.1*   HEMATOCRIT 29.4*   MCV 89.4   MCH 27.7   MCHC 31.0*   RDW 51.5*   PLATELETCT 294   MPV 8.8*     Recent Labs     12/29/21  1732   SODIUM 137   POTASSIUM 4.5   CHLORIDE 102   CO2 24   GLUCOSE 119*   BUN 14   CREATININE 0.83   CALCIUM 9.0     Recent Labs     12/29/21  1732   ALTSGPT 28   ASTSGOT 31   ALKPHOSPHAT 62   TBILIRUBIN 0.4   GLUCOSE 119*         Recent Labs     12/29/21  1732   NTPROBNP 210*         Recent Labs     12/29/21  1732   TROPONINT 19       Imaging:  CT-CTA CHEST PULMONARY ARTERY W/ RECONS   Final Result       1.  Bilateral pulmonary emboli at the main pulmonary artery branch point extending into segmental arteries. Borderline right heart strain.   2.  Left lower lobe atelectasis versus pneumonia.   3.  Small hiatal hernia.   4.  4 mm right upper and lower lobe pleural-based nodules. Follow-up recommendations below.      Low Risk: No routine follow-up      High Risk: Optional CT at 12 months      Comments: Use most suspicious nodule as guide to management. Follow-up intervals may vary according to size and risk.      Low Risk - Minimal or absent history of smoking and of other known risk factors.      High Risk - History of smoking or of other known risk factors.      Note: These recommendations do not apply to lung cancer screening, patients with immunosuppression, or patients with known primary cancer.      Fleischner Society 2017 Guidelines for Management of Incidentally Detected Pulmonary Nodules in Adults      Dr. Olivera discussed these findings with Dr. Lutz at 11:15 PM by telephone on 12/29/2021      DX-CHEST-PORTABLE (1 VIEW)   Final Result      Left basilar opacity could be from atelectasis favored over consolidation            Assessment/Plan:  I anticipate this patient will require at least two midnights for appropriate medical management, necessitating inpatient admission.    * Pneumonia due to COVID-19 virus- (present on admission)  Assessment & Plan  -Inpatient status on telemetry unit as she also has bilateral PE.  -Tested positive for Covid in the ED.  -Added CRP and procalcitonin.  -She is getting full anticoagulation with heparin.  -Added dexamethasone    Hypoxia  Assessment & Plan  -With underlying Covid and lateral PE.  -Currently on 2 L of supplemental oxygen to maintain O2 saturations above 90%.  -Plan as above.    Bilateral pulmonary embolism (HCC)  Assessment & Plan  -DDimer was elevated and given recent surgery  -CT angiogram of the chest  was done showing Bilateral pulmonary emboli at the  main pulmonary artery branch point extending into segmental arteries.  With borderline right heart strain RV ratio 0.9  -Heparin Infusion per PE protocol  -PE Risk Stratification: Low- Intermediate Risk with PESI score of 2. Vitals signs stable  -I had echocardiogram in the morning.  Additionally also added EKG.    History of cosmetic surgery  Assessment & Plan  -History of cosmetic surgery procedure of her breasts and abdomen 3 weeks ago performed by Dr. Keyes in [place].  ERP attempted to contact office but no one answered overnight.  -Patient has areas of hematoma and induration, and given that her right breast is also warmer as compared to the left, will cover with IV antibiotics, vancomycin.  She is allergic to penicillin.  -Additionally, if unable to communicate with her surgeon, she may need a nonemergency plastic surgery consult to check her wounds.    Type II diabetes mellitus (HCC)  Assessment & Plan  -Hold metformin and start regular insulin sliding scale.    HTN (hypertension)  Assessment & Plan  -Continue lisinopril.      VTE prophylaxis: therapeutic anticoagulation with Heparin

## 2021-12-31 LAB
ANION GAP SERPL CALC-SCNC: 10 MMOL/L (ref 7–16)
BASOPHILS # BLD AUTO: 0.4 % (ref 0–1.8)
BASOPHILS # BLD: 0.02 K/UL (ref 0–0.12)
BUN SERPL-MCNC: 17 MG/DL (ref 8–22)
CALCIUM SERPL-MCNC: 8.7 MG/DL (ref 8.5–10.5)
CHLORIDE SERPL-SCNC: 104 MMOL/L (ref 96–112)
CO2 SERPL-SCNC: 23 MMOL/L (ref 20–33)
CREAT SERPL-MCNC: 0.67 MG/DL (ref 0.5–1.4)
EOSINOPHIL # BLD AUTO: 0 K/UL (ref 0–0.51)
EOSINOPHIL NFR BLD: 0 % (ref 0–6.9)
ERYTHROCYTE [DISTWIDTH] IN BLOOD BY AUTOMATED COUNT: 51.2 FL (ref 35.9–50)
GLUCOSE BLD-MCNC: 118 MG/DL (ref 65–99)
GLUCOSE BLD-MCNC: 123 MG/DL (ref 65–99)
GLUCOSE BLD-MCNC: 147 MG/DL (ref 65–99)
GLUCOSE BLD-MCNC: 246 MG/DL (ref 65–99)
GLUCOSE SERPL-MCNC: 292 MG/DL (ref 65–99)
HCT VFR BLD AUTO: 29.5 % (ref 37–47)
HGB BLD-MCNC: 8.9 G/DL (ref 12–16)
IMM GRANULOCYTES # BLD AUTO: 0.03 K/UL (ref 0–0.11)
IMM GRANULOCYTES NFR BLD AUTO: 0.6 % (ref 0–0.9)
LYMPHOCYTES # BLD AUTO: 0.48 K/UL (ref 1–4.8)
LYMPHOCYTES NFR BLD: 8.9 % (ref 22–41)
MCH RBC QN AUTO: 27.5 PG (ref 27–33)
MCHC RBC AUTO-ENTMCNC: 30.2 G/DL (ref 33.6–35)
MCV RBC AUTO: 91 FL (ref 81.4–97.8)
MONOCYTES # BLD AUTO: 0.07 K/UL (ref 0–0.85)
MONOCYTES NFR BLD AUTO: 1.3 % (ref 0–13.4)
NEUTROPHILS # BLD AUTO: 4.82 K/UL (ref 2–7.15)
NEUTROPHILS NFR BLD: 88.8 % (ref 44–72)
NRBC # BLD AUTO: 0 K/UL
NRBC BLD-RTO: 0 /100 WBC
PLATELET # BLD AUTO: 368 K/UL (ref 164–446)
PMV BLD AUTO: 8.7 FL (ref 9–12.9)
POTASSIUM SERPL-SCNC: 4.5 MMOL/L (ref 3.6–5.5)
RBC # BLD AUTO: 3.24 M/UL (ref 4.2–5.4)
SODIUM SERPL-SCNC: 137 MMOL/L (ref 135–145)
UFH PPP CHRO-ACNC: 0.49 IU/ML
WBC # BLD AUTO: 5.4 K/UL (ref 4.8–10.8)

## 2021-12-31 PROCEDURE — 700111 HCHG RX REV CODE 636 W/ 250 OVERRIDE (IP): Performed by: HOSPITALIST

## 2021-12-31 PROCEDURE — 85025 COMPLETE CBC W/AUTO DIFF WBC: CPT

## 2021-12-31 PROCEDURE — A9270 NON-COVERED ITEM OR SERVICE: HCPCS | Performed by: HOSPITALIST

## 2021-12-31 PROCEDURE — 82962 GLUCOSE BLOOD TEST: CPT

## 2021-12-31 PROCEDURE — 700102 HCHG RX REV CODE 250 W/ 637 OVERRIDE(OP): Performed by: HOSPITALIST

## 2021-12-31 PROCEDURE — 99232 SBSQ HOSP IP/OBS MODERATE 35: CPT | Performed by: STUDENT IN AN ORGANIZED HEALTH CARE EDUCATION/TRAINING PROGRAM

## 2021-12-31 PROCEDURE — 85520 HEPARIN ASSAY: CPT

## 2021-12-31 PROCEDURE — 770020 HCHG ROOM/CARE - TELE (206)

## 2021-12-31 PROCEDURE — 700102 HCHG RX REV CODE 250 W/ 637 OVERRIDE(OP): Performed by: STUDENT IN AN ORGANIZED HEALTH CARE EDUCATION/TRAINING PROGRAM

## 2021-12-31 PROCEDURE — 80048 BASIC METABOLIC PNL TOTAL CA: CPT

## 2021-12-31 PROCEDURE — 36415 COLL VENOUS BLD VENIPUNCTURE: CPT

## 2021-12-31 RX ORDER — DEXAMETHASONE 1 MG
6 TABLET ORAL DAILY
Qty: 48 TABLET | Refills: 0 | Status: SHIPPED | OUTPATIENT
Start: 2021-12-31 | End: 2022-01-08

## 2021-12-31 RX ORDER — RIVAROXABAN 15 MG-20MG
KIT ORAL
Qty: 51 EACH | Refills: 0 | Status: SHIPPED | OUTPATIENT
Start: 2021-12-31

## 2021-12-31 RX ORDER — APIXABAN 5 MG (74)
KIT ORAL
Qty: 74 EACH | Refills: 0 | Status: SHIPPED | OUTPATIENT
Start: 2021-12-31 | End: 2021-12-31

## 2021-12-31 RX ADMIN — HEPARIN SODIUM 18 UNITS/KG/HR: 5000 INJECTION, SOLUTION INTRAVENOUS at 07:15

## 2021-12-31 RX ADMIN — ACETAMINOPHEN 650 MG: 325 TABLET, FILM COATED ORAL at 08:06

## 2021-12-31 RX ADMIN — HEPARIN SODIUM 18 UNITS/KG/HR: 5000 INJECTION, SOLUTION INTRAVENOUS at 05:38

## 2021-12-31 RX ADMIN — DEXAMETHASONE SODIUM PHOSPHATE 6 MG: 4 INJECTION, SOLUTION INTRA-ARTICULAR; INTRALESIONAL; INTRAMUSCULAR; INTRAVENOUS; SOFT TISSUE at 05:46

## 2021-12-31 RX ADMIN — INSULIN GLARGINE 14 UNITS: 100 INJECTION, SOLUTION SUBCUTANEOUS at 19:27

## 2021-12-31 RX ADMIN — DOCUSATE SODIUM 50 MG AND SENNOSIDES 8.6 MG 2 TABLET: 8.6; 5 TABLET, FILM COATED ORAL at 05:43

## 2021-12-31 RX ADMIN — INSULIN HUMAN 6 UNITS: 100 INJECTION, SOLUTION PARENTERAL at 12:24

## 2021-12-31 SDOH — ECONOMIC STABILITY: TRANSPORTATION INSECURITY
IN THE PAST 12 MONTHS, HAS LACK OF TRANSPORTATION KEPT YOU FROM MEETINGS, WORK, OR FROM GETTING THINGS NEEDED FOR DAILY LIVING?: NO

## 2021-12-31 SDOH — ECONOMIC STABILITY: HOUSING INSECURITY: IN THE LAST 12 MONTHS, HOW MANY PLACES HAVE YOU LIVED?: 1

## 2021-12-31 SDOH — ECONOMIC STABILITY: INCOME INSECURITY: IN THE LAST 12 MONTHS, WAS THERE A TIME WHEN YOU WERE NOT ABLE TO PAY THE MORTGAGE OR RENT ON TIME?: NO

## 2021-12-31 SDOH — ECONOMIC STABILITY: HOUSING INSECURITY
IN THE LAST 12 MONTHS, WAS THERE A TIME WHEN YOU DID NOT HAVE A STEADY PLACE TO SLEEP OR SLEPT IN A SHELTER (INCLUDING NOW)?: NO

## 2021-12-31 SDOH — ECONOMIC STABILITY: FOOD INSECURITY: WITHIN THE PAST 12 MONTHS, YOU WORRIED THAT YOUR FOOD WOULD RUN OUT BEFORE YOU GOT MONEY TO BUY MORE.: NEVER TRUE

## 2021-12-31 SDOH — ECONOMIC STABILITY: TRANSPORTATION INSECURITY
IN THE PAST 12 MONTHS, HAS THE LACK OF TRANSPORTATION KEPT YOU FROM MEDICAL APPOINTMENTS OR FROM GETTING MEDICATIONS?: NO

## 2021-12-31 SDOH — ECONOMIC STABILITY: FOOD INSECURITY: WITHIN THE PAST 12 MONTHS, THE FOOD YOU BOUGHT JUST DIDN'T LAST AND YOU DIDN'T HAVE MONEY TO GET MORE.: NEVER TRUE

## 2021-12-31 ASSESSMENT — SOCIAL DETERMINANTS OF HEALTH (SDOH)
IN A TYPICAL WEEK, HOW MANY TIMES DO YOU TALK ON THE PHONE WITH FAMILY, FRIENDS, OR NEIGHBORS?: MORE THAN THREE TIMES A WEEK
WITHIN THE LAST YEAR, HAVE TO BEEN RAPED OR FORCED TO HAVE ANY KIND OF SEXUAL ACTIVITY BY YOUR PARTNER OR EX-PARTNER?: NO
HOW OFTEN DO YOU ATTEND CHURCH OR RELIGIOUS SERVICES?: MORE THAN 4 TIMES PER YEAR
HOW HARD IS IT FOR YOU TO PAY FOR THE VERY BASICS LIKE FOOD, HOUSING, MEDICAL CARE, AND HEATING?: NOT HARD AT ALL
HOW OFTEN DO YOU ATTENT MEETINGS OF THE CLUB OR ORGANIZATION YOU BELONG TO?: MORE THAN 4 TIMES PER YEAR
WITHIN THE LAST YEAR, HAVE YOU BEEN AFRAID OF YOUR PARTNER OR EX-PARTNER?: NO
HOW OFTEN DO YOU GET TOGETHER WITH FRIENDS OR RELATIVES?: MORE THAN THREE TIMES A WEEK
WITHIN THE LAST YEAR, HAVE YOU BEEN KICKED, HIT, SLAPPED, OR OTHERWISE PHYSICALLY HURT BY YOUR PARTNER OR EX-PARTNER?: NO
DO YOU BELONG TO ANY CLUBS OR ORGANIZATIONS SUCH AS CHURCH GROUPS UNIONS, FRATERNAL OR ATHLETIC GROUPS, OR SCHOOL GROUPS?: YES
WITHIN THE LAST YEAR, HAVE YOU BEEN HUMILIATED OR EMOTIONALLY ABUSED IN OTHER WAYS BY YOUR PARTNER OR EX-PARTNER?: NO

## 2021-12-31 ASSESSMENT — ENCOUNTER SYMPTOMS
SHORTNESS OF BREATH: 1
VOMITING: 0
BRUISES/BLEEDS EASILY: 0
ABDOMINAL PAIN: 0
NAUSEA: 0
FEVER: 0
MYALGIAS: 0
CHILLS: 1

## 2021-12-31 ASSESSMENT — PAIN DESCRIPTION - PAIN TYPE
TYPE: ACUTE PAIN
TYPE: ACUTE PAIN

## 2021-12-31 ASSESSMENT — FIBROSIS 4 INDEX: FIB4 SCORE: 1.12

## 2021-12-31 NOTE — PROGRESS NOTES
Pt transferred to floor via gurney with this RN and CNA. Pt placed on monitor. Pt heparin drip running, verified with 2 RN verification. Pt oriented to unit and room, call light given to pt, pt demonstrated proper use of call light. Pt able to make needs known.

## 2021-12-31 NOTE — CARE PLAN
The patient is Watcher - Medium risk of patient condition declining or worsening    Shift Goals  Clinical Goals: safety, labs, O2, US, FSBS, heparin drip  Patient Goals: decrease O2 requirements, PE  Family Goals: musa     Progress made toward(s) clinical / shift goals:  On 1-1.5L NC O2, VSS, ambulates to the toilet with SBA, [place] speaking- used family to communicate with pt. No PRN's given, slept between care, can make her needs known, A*Ox4, no over night events to report.    Tele monitored- HR 61-87, NSR, PVC/PAC's, 0.13/0.07/0.35            Problem: Psychosocial  Goal: Patient's ability to verbalize feelings about condition will improve  12/31/2021 0645 by Bisi Betancourt R.N.  Outcome: Progressing  12/31/2021 0153 by Bisi Betancourt R.N.  Outcome: Progressing     Problem: Respiratory  Goal: Patient will achieve/maintain optimum respiratory ventilation and gas exchange  12/31/2021 0645 by Bisi Betancourt, R.N.  Outcome: Progressing  12/31/2021 0153 by Bisi Betancourt R.N.  Outcome: Progressing     Problem: Mobility  Goal: Patient's capacity to carry out activities will improve  12/31/2021 0645 by Bisi Betancourt, R.N.  Outcome: Progressing  12/31/2021 0153 by Bisi Betancourt R.N.  Outcome: Progressing

## 2021-12-31 NOTE — DISCHARGE PLANNING
Care Transition Team Assessment      This RN CM spoke to pt's daughter, Lisha, verified facesheet and obtained the following information. Pt lives in a one story house with her daughter. Pt did not use home oxygen or other DMEs. Pt was independent with ADLs and IADLs prior to hospitalization. Pt does not have insurance coverage. Pt does not have PCP. Informed pt's daughter about need for PCP, insurance. Pt's daughter to apply for insurance and set up PCP when they go back to [place], [place].           Information Source  Orientation Level: Oriented X4  Information Given By: Other (Comments) (daughter)  Informant's Name: LISHA OCHOA         Elopement Risk  Legal Hold: No  Ambulatory or Self Mobile in Wheelchair: Yes  Disoriented: No  Psychiatric Symptoms: None  History of Wandering: No  Elopement this Admit: No  Vocalizing Wanting to Leave: No  Displays Behaviors, Body Language Wanting to Leave: No-Not at Risk for Elopement  Elopement Risk: Not at Risk for Elopement    Interdisciplinary Discharge Planning  Lives with - Patient's Self Care Capacity: Adult Children  Patient or legal guardian wants to designate a caregiver: No  Support Systems: Children  Housing / Facility: 1 Story House  Mobility Issues: No  Assistance Needed: Yes  Durable Medical Equipment: Not Applicable    Discharge Preparedness  What is your plan after discharge?: Home with help  What are your discharge supports?: Child  Prior Functional Level: Independent with Activities of Daily Living,Independent with Medication Management,Ambulatory,Drives Self    Functional Assesment  Prior Functional Level: Independent with Activities of Daily Living,Independent with Medication Management,Ambulatory,Drives Self    Finances  Financial Barriers to Discharge: No  Prescription Coverage: Yes    Vision / Hearing Impairment  Vision Impairment : Yes  Right Eye Vision: Impaired  Left Eye Vision: Impaired  Hearing Impairment : No         Advance  Directive  Advance Directive?: None  Advance Directive offered?: AD Booklet refused    Domestic Abuse  Have you ever been the victim of abuse or violence?: No  Physical Abuse or Sexual Abuse: No  Verbal Abuse or Emotional Abuse: No  Possible Abuse/Neglect Reported to:: Not Applicable         Discharge Risks or Barriers  Discharge risks or barriers?: Complex medical needs  Patient risk factors: Complex medical needs    Anticipated Discharge Information  Discharge Disposition: Discharged to home/self care (01)

## 2021-12-31 NOTE — PROGRESS NOTES
Hospital Medicine Daily Progress Note    Date of Service  12/31/2021    Chief Complaint  Sammie Dc is a 66 y.o. female admitted 12/29/2021 with COVID and PE    Hospital Course  Sammie Dc, is a 66 y.o. female, who is visiting from [place].  She had a recent Cosmetic Surgery procedure of her breast and abdomen on 12/10.  She presented to the ED on 12/29/2021 with shortness of breath that is started around after surgery that progressively is getting worse.  She also has associated productive cough.  Additionally patient's noticed that she had some oozing/leaking from nipple and her abdominal flap incision area also has an area of black skin.  Denies fever but does have some chills.  She also denies chest pain, bilateral leg edema.     Tested positive for Covid in the ED.  Currently on 2 L of supplemental oxygen.  Additionally tested positive for PE with bilateral pulmonary emboli at the main pulmonary artery branch extending to segmental arteries with borderline right heart strain.    Interval Problem Update  Patient continues require 2 L of oxygen.  No overnight events.  Case was discussed with plastic surgery.  On physical examination patient does not appear infected.  Plastic surgery reports that the umbilicus does appear slightly necrotic and may need surgery in the future, but not likely at this time.  Also there is some possible necrosis at the lower abdominal mid incision site.  Plastic surgery recommending that we observe the patient while she is on anticoagulation since there is a hematoma of the right breast, which could potentially worsen.    12/31: No overnight events.  Right breast, umbilical and abdominal surgical scars examined they appear to be healing well.  Antibiotics were discontinued yesterday and she does not appear to be showing any signs of infection.  Patient is tolerating anticoagulation.  Orders for Xarelto on discharge placed.  Orders for oxygen also  ordered.    I have personally seen and examined the patient at bedside. I discussed the plan of care with patient, family and bedside RN.    Consultants/Specialty  plastic surgery    Code Status  Full Code    Disposition  Patient is not medically cleared for discharge.   Anticipate discharge to to home with close outpatient follow-up.  I have placed the appropriate orders for post-discharge needs.    Review of Systems  Review of Systems   Constitutional: Positive for chills and malaise/fatigue. Negative for fever.   Respiratory: Positive for shortness of breath.    Cardiovascular: Negative for chest pain.   Gastrointestinal: Negative for abdominal pain, nausea and vomiting.   Musculoskeletal: Negative for myalgias.        Reports improvement in breast swelling.   Endo/Heme/Allergies: Does not bruise/bleed easily.   All other systems reviewed and are negative.       Physical Exam  Temp:  [35.9 °C (96.7 °F)-36.7 °C (98.1 °F)] 35.9 °C (96.7 °F)  Pulse:  [70-96] 77  Resp:  [18-20] 19  BP: ()/(54-64) 94/55  SpO2:  [92 %-98 %] 95 %    Physical Exam  Vitals and nursing note reviewed. Exam conducted with a chaperone present.   Constitutional:       General: She is not in acute distress.     Appearance: Normal appearance. She is not ill-appearing.   HENT:      Head: Normocephalic and atraumatic.   Eyes:      General: No scleral icterus.        Right eye: No discharge.         Left eye: No discharge.   Cardiovascular:      Rate and Rhythm: Normal rate and regular rhythm.      Heart sounds: Normal heart sounds. No murmur heard.      Pulmonary:      Effort: Pulmonary effort is normal. No respiratory distress.      Breath sounds: No wheezing or rales.   Chest:      Comments: Breast augmentation bilaterally. Hematoma of right breast. Swelling of both breasts, normal temp. No discharge. Does not appear infected.  Abdominal:      General: There is no distension.      Palpations: Abdomen is soft.      Tenderness: There is no  abdominal tenderness. There is no guarding.      Comments: Some necrosis of umbilicus and lower mid abdominal scar-unchanged.   Musculoskeletal:         General: No tenderness.      Right lower leg: No edema.      Left lower leg: No edema.   Skin:     Coloration: Skin is not pale.      Findings: No lesion or rash.   Neurological:      Mental Status: She is alert and oriented to person, place, and time.      Cranial Nerves: No cranial nerve deficit.      Motor: No weakness.   Psychiatric:         Mood and Affect: Mood normal.         Behavior: Behavior normal.         Thought Content: Thought content normal.         Fluids    Intake/Output Summary (Last 24 hours) at 12/31/2021 1248  Last data filed at 12/30/2021 2100  Gross per 24 hour   Intake 480 ml   Output --   Net 480 ml       Laboratory  Recent Labs     12/29/21  1732 12/30/21  0100 12/31/21  1017   WBC 6.0 4.2* 5.4   RBC 3.29* 2.80* 3.24*   HEMOGLOBIN 9.1* 8.0* 8.9*   HEMATOCRIT 29.4* 24.7* 29.5*   MCV 89.4 88.2 91.0   MCH 27.7 28.6 27.5   MCHC 31.0* 32.4* 30.2*   RDW 51.5* 49.5 51.2*   PLATELETCT 294 288 368   MPV 8.8* 9.2 8.7*     Recent Labs     12/29/21  1732 12/30/21  0100 12/31/21  1017   SODIUM 137 135 137   POTASSIUM 4.5 3.9 4.5   CHLORIDE 102 102 104   CO2 24 24 23   GLUCOSE 119* 95 292*   BUN 14 11 17   CREATININE 0.83 0.56 0.67   CALCIUM 9.0 8.6 8.7     Recent Labs     12/29/21  1752 12/30/21  0100   APTT  --  33.7   INR 1.02  --                Imaging  US-EXTREMITY VENOUS LOWER BILAT   Final Result      EC-ECHOCARDIOGRAM LTD W/ CONT   Final Result      CT-CTA CHEST PULMONARY ARTERY W/ RECONS   Final Result      1.  Bilateral pulmonary emboli at the main pulmonary artery branch point extending into segmental arteries. Borderline right heart strain.   2.  Left lower lobe atelectasis versus pneumonia.   3.  Small hiatal hernia.   4.  4 mm right upper and lower lobe pleural-based nodules. Follow-up recommendations below.      Low Risk: No routine  follow-up      High Risk: Optional CT at 12 months      Comments: Use most suspicious nodule as guide to management. Follow-up intervals may vary according to size and risk.      Low Risk - Minimal or absent history of smoking and of other known risk factors.      High Risk - History of smoking or of other known risk factors.      Note: These recommendations do not apply to lung cancer screening, patients with immunosuppression, or patients with known primary cancer.      Fleischner Society 2017 Guidelines for Management of Incidentally Detected Pulmonary Nodules in Adults      Dr. Olivera discussed these findings with Dr. Lutz at 11:15 PM by telephone on 12/29/2021      DX-CHEST-PORTABLE (1 VIEW)   Final Result      Left basilar opacity could be from atelectasis favored over consolidation           Assessment/Plan  * Pneumonia due to COVID-19 virus- (present on admission)  Assessment & Plan  -Inpatient status on telemetry unit as she also has bilateral PE.  -Tested positive for Covid in the ED.  -Added CRP and procalcitonin.  -She is getting full anticoagulation with heparin.  -Added dexamethasone  12/30: on 2L    Multiple lung nodules on CT  Assessment & Plan  Seen on CT  Possibly due to underlying infection  Follow up as outpatient    Hypoxia  Assessment & Plan  -With underlying Covid and lateral PE.  -Currently on 2 L of supplemental oxygen to maintain O2 saturations above 90%.  Patient's respiratory status is improving however on ambulation she is requiring supplemental oxygen.  Home O2 orders placed and making arrangements for the patient continue her supplemental oxygen when she discharges to [place].    HTN (hypertension)  Assessment & Plan  -Continue lisinopril.    Type II diabetes mellitus (HCC)  Assessment & Plan  -Hold metformin and start regular insulin sliding scale.    History of cosmetic surgery  Assessment & Plan  -History of cosmetic surgery procedure of her breasts and abdomen 3 weeks ago  performed by Dr. Keyes in [place].  ERP attempted to contact office but no one answered overnight.  -Patient has areas of hematoma and induration, and given that her right breast is also warmer as compared to the left, will cover with IV antibiotics, vancomycin.  She is allergic to penicillin.  -Additionally, if unable to communicate with her surgeon, she may need a nonemergency plastic surgery consult to check her wounds.  12/30: case discussed with Plastic surgery, they report here is some skin necrosis of the umbilicus and mid abdomen. They recommend that with the patients COVID and PE it is best to monitor for now for worsening of hematoma for 1-2 days. There is no active infection  12/31: Slight improvement in the coloration of the patient's hematoma the right breast, tolerating anticoagulation well.    Bilateral pulmonary embolism (HCC)  Assessment & Plan  -DDimer was elevated and given recent surgery  -CT angiogram of the chest  was done showing Bilateral pulmonary emboli at the main pulmonary artery branch point extending into segmental arteries.  With borderline right heart strain RV ratio 0.9  -Heparin Infusion per PE protocol  -PE Risk Stratification: Low- Intermediate Risk with PESI score of 2. Vitals signs stable  -I had echocardiogram in the morning.  Additionally also added EKG.  I discharge patient will start Xarelto and understands she will need to establish with a primary care provider back in [place].  Patient is tolerating anticoagulation with no signs of bleeding.       VTE prophylaxis: therapeutic anticoagulation with heparin    I have performed a physical exam and reviewed and updated ROS and Plan today (12/31/2021). In review of yesterday's note (12/30/2021), there are no changes except as documented above.

## 2021-12-31 NOTE — DISCHARGE PLANNING
Anticipated Discharge Disposition:   Home with home oxygen, Meds to Beds    Action:   Discussed discharge planning needs during rounds. Per DO, plan to discharge pending O2 eval.     Per O2 eval, pt needs home oxygen. DME order in place. Pt lives in [place], [place] with her daughter. They are here on vacation, staying with family members. Pt does not have insurance. No PCP. Pt's daughter to help set up insurance and PCP in [place].    RN CECE called [facility]. They do not service pt's area. They do have a portable oxygen concentrator for sale at $1,800.     RN CECE called [facility]. RN CECE was informed that [facility] ([phone])  services pt's area. RN CECE called [facility]. They accept self pay, $145 per month. They will need DME O2 order faxed to fax number [phone].    Discussed with DO and bedside RN. Plan to O2 reeval.     Barriers to Discharge:   Medical clearance.  No payor source.  Need O2 set up locally, for air travel use and for [facility], pending repeat home O2 eval.  Meds to Beds delivery.    Plan:   Hospital Care Management will continue to follow and assist with discharge planning needs.

## 2021-12-31 NOTE — CARE PLAN
The patient is Stable - Low risk of patient condition declining or worsening    Shift Goals  Clinical Goals: wean o2, pain control  Patient Goals: pain control  Family Goals: speak with md    Progress made toward(s) clinical / shift goals:  Weaned off o2.

## 2021-12-31 NOTE — CARE PLAN
The patient is Watcher - Medium risk of patient condition declining or worsening         Progress made toward(s) clinical / shift goals:    Problem: Knowledge Deficit - Standard  Goal: Patient and family/care givers will demonstrate understanding of plan of care, disease process/condition, diagnostic tests and medications  Outcome: Progressing       Patient is not progressing towards the following goals:

## 2021-12-31 NOTE — PROGRESS NOTES
4 Eyes Skin Assessment Completed by TAMARA Francois and TAMARA Singh.    Head WDL  Ears WDL  Nose WDL  Mouth WDL  Neck WDL  Breast/Chest Redness, Bruising, Edema and Incision  Shoulder Blades WDL  Spine WDL  (R) Arm/Elbow/Hand WDL  (L) Arm/Elbow/Hand WDL  Abdomen Redness, Bruising and Incision  Groin WDL  Scrotum/Coccyx/Buttocks WDL  (R) Leg WDL  (L) Leg WDL  (R) Heel/Foot/Toe WDL  (L) Heel/Foot/Toe WDL          Devices In Places Tele Box and Pulse Ox      Interventions In Place NC W/Ear Foams, Pillows and Pressure Redistribution Mattress    Possible Skin Injury No    Pictures Uploaded Into Epic Yes  Wound Consult Placed Yes  RN Wound Prevention Protocol Ordered No

## 2021-12-31 NOTE — FACE TO FACE
"Face to Face Note  -  Durable Medical Equipment    Wes Cade D.O. - NPI: [id number]  I certify that this patient is under my care and that they had a durable medical equipment(DME)face to face encounter by myself that meets the physician DME face-to-face encounter requirements with this patient on:    Date of encounter:   Patient:                    MRN:                       YOB: 2020  Sammie Dc  [id number]  1965     The encounter with the patient was in whole, or in part, for the following medical condition, which is the primary reason for durable medical equipment:  Covid-19 Infection    I certify that, based on my findings, the following durable medical equipment is medically necessary:  Oxygen.    HOME O2 Saturation Measurements:(Values must be present for Home Oxygen orders)  Room air sat at rest: 91  Room air sat with amb: 86  With liters of O2: 2, O2 sat at rest with O2: 96  With Liters of O2: 2, O2 sat with amb with O2 : 94  Is the patient mobile?: Yes    My Clinical findings support the need for the above equipment due to:  Hypoxia    Supporting Symptoms: The patient requires supplemental oxygen, as the following interventions have been tried with limited or no improvement: \"Oral and/or IV steroids, \"Ambulation with oximetry and \"Incentive spirometry    If patient feels more short of breath, they can go up to 6 liters per minute and contact healthcare provider.  " Bcc Infiltrative Histology Text: There were numerous aggregates of basaloid cells demonstrating an infiltrative pattern.

## 2021-12-31 NOTE — CARE PLAN
The patient is Stable - Low risk of patient condition declining or worsening    Shift Goals  Clinical Goals: wean o2, pain control  Patient Goals: pain control  Family Goals: speak with md

## 2022-01-01 VITALS
HEART RATE: 60 BPM | OXYGEN SATURATION: 93 % | HEIGHT: 60 IN | SYSTOLIC BLOOD PRESSURE: 102 MMHG | TEMPERATURE: 97.2 F | BODY MASS INDEX: 25.06 KG/M2 | DIASTOLIC BLOOD PRESSURE: 65 MMHG | RESPIRATION RATE: 17 BRPM | WEIGHT: 127.65 LBS

## 2022-01-01 LAB
GLUCOSE BLD-MCNC: 88 MG/DL (ref 65–99)
UFH PPP CHRO-ACNC: 0.5 IU/ML

## 2022-01-01 PROCEDURE — 700102 HCHG RX REV CODE 250 W/ 637 OVERRIDE(OP): Performed by: HOSPITALIST

## 2022-01-01 PROCEDURE — A9270 NON-COVERED ITEM OR SERVICE: HCPCS | Performed by: HOSPITALIST

## 2022-01-01 PROCEDURE — 36415 COLL VENOUS BLD VENIPUNCTURE: CPT

## 2022-01-01 PROCEDURE — 700111 HCHG RX REV CODE 636 W/ 250 OVERRIDE (IP): Performed by: HOSPITALIST

## 2022-01-01 PROCEDURE — 85520 HEPARIN ASSAY: CPT

## 2022-01-01 PROCEDURE — 99239 HOSP IP/OBS DSCHRG MGMT >30: CPT | Performed by: STUDENT IN AN ORGANIZED HEALTH CARE EDUCATION/TRAINING PROGRAM

## 2022-01-01 PROCEDURE — 82962 GLUCOSE BLOOD TEST: CPT

## 2022-01-01 RX ADMIN — DOCUSATE SODIUM 50 MG AND SENNOSIDES 8.6 MG 2 TABLET: 8.6; 5 TABLET, FILM COATED ORAL at 06:14

## 2022-01-01 RX ADMIN — DEXAMETHASONE SODIUM PHOSPHATE 6 MG: 4 INJECTION, SOLUTION INTRA-ARTICULAR; INTRALESIONAL; INTRAMUSCULAR; INTRAVENOUS; SOFT TISSUE at 06:15

## 2022-01-01 ASSESSMENT — PAIN DESCRIPTION - PAIN TYPE: TYPE: ACUTE PAIN

## 2022-01-01 NOTE — DISCHARGE SUMMARY
Discharge Summary    CHIEF COMPLAINT ON ADMISSION  Chief Complaint   Patient presents with   • Shortness of Breath     since after surgery 12/10       Reason for Admission  Post Op Complication     Admission Date  12/29/2021    CODE STATUS  Full Code    HPI & HOSPITAL COURSE  Sammie Dc, is a 66 y.o. female, who is visiting from [place].  She had a recent Cosmetic Surgery procedure of her breast and abdomen on 12/10.  She presented to the ED on 12/29/2021 with shortness of breath that is started around after surgery that progressively is getting worse.  She also has associated productive cough.  Additionally patient's noticed that she had some oozing/leaking from nipple and her abdominal flap incision area also has an area of black skin.  Denies fever but does have some chills.  She also denies chest pain, bilateral leg edema.     Tested positive for Covid in the ED.  Currently on 2 L of supplemental oxygen.  Additionally tested positive for PE with bilateral pulmonary emboli at the main pulmonary artery branch extending to segmental arteries with borderline right heart strain.    Patient continues require 2 L of oxygen.  No overnight events.  Case was discussed with plastic surgery.  On physical examination patient does not appear infected.  Plastic surgery reports that the umbilicus does appear slightly necrotic and may need surgery in the future, but not likely at this time.  Also there is some possible necrosis at the lower abdominal mid incision site.  Plastic surgery recommending that we observe the patient while she is on anticoagulation since there is a hematoma of the right breast, which could potentially worsen.     12/31: No overnight events.  Right breast, umbilical and abdominal surgical scars examined they appear to be healing well.  Antibiotics were discontinued yesterday and she does not appear to be showing any signs of infection.  Patient is tolerating anticoagulation.  Orders  for Xarelto on discharge placed.  Orders for oxygen also ordered.    1/1/2021: Patient was weaned off oxygen she is tolerating room air.  Bilateral breasts are healing well patient's umbilicus and abdominal scars appear to be healing, I expressed to the patient and family that it will likely take some time and she will need to follow-up to find out if there is any long-term necrosis.  Family plans on returning back to [place] soon.  They understand that they need to avoid flying due to possibility of infecting others.  Prescription for Xarelto was provided to the patient.  I discussed with them at length that Xarelto can be expensive and they will need to find a primary care provider who can either help them find a discount or switch her over to a different medication such as warfarin which may be cheaper for the patient.  Family understands that they do need to follow-up with a primary care provider.  They also understand that they need to follow-up with the plastic surgery with their surgeon in [place].    Therefore, she is discharged in good and stable condition to home with close outpatient follow-up.    The patient met 2-midnight criteria for an inpatient stay at the time of discharge.    Discharge Date  1/1/22    FOLLOW UP ITEMS POST DISCHARGE  Pneumonia due to Covid.  Multiple lung nodules-follow primary care provider  Type 2 diabetes-continue home medications  Bilateral pulmonary embolisms-follow with primary care provider and take Xarelto as prescribed.    DISCHARGE DIAGNOSES  Principal Problem:    Pneumonia due to COVID-19 virus POA: Yes  Active Problems:    Bilateral pulmonary embolism (HCC) POA: Unknown    History of cosmetic surgery POA: Unknown    Type II diabetes mellitus (HCC) POA: Unknown    HTN (hypertension) POA: Unknown    Hypoxia POA: Unknown    Multiple lung nodules on CT POA: Unknown  Resolved Problems:    * No resolved hospital problems. *      FOLLOW UP  No future appointments.  No  follow-up provider specified.    MEDICATIONS ON DISCHARGE     Medication List      START taking these medications      Instructions   dexamethasone 1 MG Tabs  Commonly known as: DECADRON   Take 6 Tablets by mouth every day for 8 days.  Dose: 6 mg     Xarelto Starter Pack 15 & 20 MG Tbpk  Generic drug: Rivaroxaban   Take one 15 mg tablet twice a day for 21 days, then take 20 mg tablet once a day.        CONTINUE taking these medications      Instructions   acetaminophen 500 MG Tabs  Commonly known as: TYLENOL   Take 1,000 mg by mouth 1 time a day as needed for Mild Pain.  Dose: 1,000 mg     cephALEXin 500 MG Caps  Commonly known as: KEFLEX   Take 500 mg by mouth 4 times a day. 7 day supply  Dose: 500 mg     ibuprofen 800 MG Tabs  Commonly known as: MOTRIN   Take 800 mg by mouth 2 times a day as needed for Mild Pain.  Dose: 800 mg     lisinopril 10 MG Tabs  Commonly known as: PRINIVIL   Take 10 mg by mouth every day.  Dose: 10 mg     metFORMIN 500 MG Tabs  Commonly known as: GLUCOPHAGE   Take 500 mg by mouth 2 times a day with meals.  Dose: 500 mg     MUCINEX ALLERGY PO   Take 2 Tablets by mouth every 6 hours as needed (Cough).  Dose: 2 Tablet            Allergies  Allergies   Allergen Reactions   • Penicillin G Shortness of Breath and Rash     Rash and sob       DIET  Orders Placed This Encounter   Procedures   • Diet Order Diet: Consistent CHO (Diabetic)     Standing Status:   Standing     Number of Occurrences:   1     Order Specific Question:   Diet:     Answer:   Consistent CHO (Diabetic) [4]       ACTIVITY  As tolerated.  Weight bearing as tolerated    CONSULTATIONS  Plastic surgery    PROCEDURES  None    LABORATORY  Lab Results   Component Value Date    SODIUM 137 12/31/2021    POTASSIUM 4.5 12/31/2021    CHLORIDE 104 12/31/2021    CO2 23 12/31/2021    GLUCOSE 292 (H) 12/31/2021    BUN 17 12/31/2021    CREATININE 0.67 12/31/2021        Lab Results   Component Value Date    WBC 5.4 12/31/2021    HEMOGLOBIN 8.9  (L) 12/31/2021    HEMATOCRIT 29.5 (L) 12/31/2021    PLATELETCT 368 12/31/2021        Total time of the discharge process exceeds 35 minutes.

## 2022-01-01 NOTE — DOCUMENTATION QUERY
[facility]                                                                       Query Response Note      PATIENT:               WILLA MOLINA  ACCT #:                  [id number]  MRN:                     [id number]  :                      1965  ADMIT DATE:       2021 7:08 PM  DISCH DATE:          RESPONDING  PROVIDER #:        [id number]           QUERY TEXT:    Elevated D-dimer of 8.01 and Covid-19 are documented in the medical record. Considering the clinical findings and treatment, can elevated D-Dimer be further specified?    Note:  If an appropriate response is not listed below, please respond with a new note.      The patient's Clinical Indicators include:   D Dimer 8.01  Per H&P: D Dimer was elevated and given recent surgery.  Bilateral PE   Risk Factors: COVID infection, recent surgery  Treatment: CT angiogram, heparin infusion     Thank you,  Myriam Redd RN, BSN  Clinical   Connect via [employer]  Options provided:   -- Covid-19 associated coagulopathy   -- Hypercoagulable state related to Covid-19   -- Coagulopathy unrelated to Covid-19   -- Hypercoagulable state unrelated to Covid-19   -- Findings of no clinical significance   -- Unable to determine      Query created by: Myriam Redd on 2021 1:02 PM    RESPONSE TEXT:    Unable to determine          Electronically signed by:  TAMICA BERKOWITZ MD 2022 1:05 AM

## 2022-01-01 NOTE — CARE PLAN
The patient is Stable - Low risk of patient condition declining or worsening    Shift Goals  Clinical Goals: Wean O2, discharge  Patient Goals: Discharge  Family Goals: musa    Progress made toward(s) clinical / shift goals:    Problem: Respiratory  Goal: Patient will achieve/maintain optimum respiratory ventilation and gas exchange  Outcome: Met       Patient is not progressing towards the following goals:

## 2022-01-01 NOTE — PROGRESS NOTES
Received report from night shift RNBisi . Assumed care of pt at 0645. Pt reports no pain, nausea, vomiting, numbness, tingling, at this time. AOx4. Updated pt on plan of care. Pt resting comfortably in bed. Fall precautions and education done. Educated on use of call light which is placed nearby patient. Hourly rounding and continuous pulse ox monitoring in place, O2 saturation at 92% on 1.5L NC. Questions and concerns answered at this time. Patient reports no other needs at the moment.

## 2022-01-01 NOTE — DISCHARGE INSTRUCTIONS
Discharge Instructions    Discharged to home by car with relative. Discharged via wheelchair, hospital escort: Yes.  Special equipment needed: Not Applicable    Be sure to schedule a follow-up appointment with your primary care doctor or any specialists as instructed.     Discharge Plan:   Diet Plan: Discussed  Activity Level: Discussed  Confirmed Follow up Appointment: Patient to Call and Schedule Appointment  Confirmed Symptoms Management: Discussed  Medication Reconciliation Updated: Yes  Influenza Vaccine Indication: Not indicated: Previously immunized this influenza season and > 8 years of age    I understand that a diet low in cholesterol, fat, and sodium is recommended for good health. Unless I have been given specific instructions below for another diet, I accept this instruction as my diet prescription.   Other diet: Diabetic    Special Instructions: None    · Is patient discharged on Warfarin / Coumadin?   No     Depression / Suicide Risk    As [facility] are [facility], it is important to learn how to keep safe from harming yourself.    Recognize the warning signs:  · Abrupt changes in personality, positive or negative- including increase in energy   · Giving away possessions  · Change in eating patterns- significant weight changes-  positive or negative  · Change in sleeping patterns- unable to sleep or sleeping all the time   · Unwillingness or inability to communicate  · Depression  · Unusual sadness, discouragement and loneliness  · Talk of wanting to die  · Neglect of personal appearance   · Rebelliousness- reckless behavior  · Withdrawal from people/activities they love  · Confusion- inability to concentrate     If you or a loved one observes any of these behaviors or has concerns about self-harm, here's what you can do:  · Talk about it- your feelings and reasons for harming yourself  · Remove any means that you might use to hurt yourself (examples: pills, rope, extension  cords, firearm)  · Get professional help from the community (Mental Health, Substance Abuse, psychological counseling)  · Do not be alone:Call your Safe Contact- someone whom you trust who will be there for you.  · Call your local CRISIS HOTLINE 613-8847 or 140-715-8250  · Call your local Children's Mobile Crisis Response Team [place] (644) 716-5119 or www."[employer]  · Call the toll free National Suicide Prevention Hotlines   · National Suicide Prevention Lifeline 081-219-EYNP (1092)  · [employer] 800-SUICIDE (118-2792)    Rivaroxaban oral tablets  ¿Qué es valdez medicamento?  El RIVAROXABÁN es un anticoagulante. Se usa para tratar los coágulos sanguíneos en los pulmones o las venas. Se usa también para prevenir los coágulos sanguíneos en los pulmones o en las venas. También se usa para reducir la posibilidad de accidente cerebrovascular en personas con tequila afección médica llamada fibrilación auricular.  Valdez medicamento puede ser utilizado para otros usos; si tiene alguna pregunta consulte con waterman proveedor de atención médica o con waterman farmacéutico.  MARCAS COMUNES: Xarelto, Xarelto Starter Pack  ¿Qué le isabela informar a mi profesional de la nora antes de nydia valdez medicamento?  Necesitan saber si usted presenta alguno de los siguientes problemas o situaciones:  síndrome de anticuerpo antifosfolípido trastornos de sangrado hemorragia cerebral priyank en las heces (heces negras o de aspecto alquitranado) o si hay priyank en waterman vómito antecedentes de coágulos sanguíneos antecedentes de hemorragia estomacal enfermedad renal enfermedad hepática recuentos sanguíneos bajos, faina baja cantidad de glóbulos blancos, plaquetas o glóbulos rojos procedimiento grewal o epidural planificado o reciente si lily medicamentos que tratan o previenen coágulos sanguíneos tequila reacción alérgica o inusual a rivaroxabán, a otros medicamentos, alimentos, colorantes o conservantes si está embarazada o buscando quedar  embarazada si está amamantando a un bebé  ¿Cómo isabela utilizar valdez medicamento?  [place] valdez medicamento por vía oral con un vaso de agua. Siga las instrucciones de la etiqueta del medicamento. [place] waterman medicamento a intervalos regulares. No lo tome con tequila frecuencia mayor a la indicada. No deje de tomarlo, excepto si así lo indica waterman médico. Dejar de nydia valdez medicamento podría aumentar waterman riesgo de tener un coágulo sanguíneo. Asegúrese de volver a surtir waterman receta antes de quedarse sin medicamento.  Si está tomando valedz medicamento después de tequila cirugía de reemplazo de cadera o rodilla, tómelo con o sin alimentos. Si está tomando valdez medicamento para la fibrilación auricular, tómelo con waterman comida de la noche. Si está tomando valdez medicamento para tratar coágulos sanguíneos, tómelo con alimentos a la misma hora todos los días. Si no puede tragar la tableta, puede triturarla y mezclarla con puré de manzana. Coma de inmediato el puré de manzana. Debe comer más comida inmediatamente después de comer el puré de manzana que contiene la tableta triturada.  Hable con waterman pediatra para informarse acerca del uso de valdez medicamento en niños. Puede requerir atención especial.  Sobredosis: Póngase en contacto inmediatamente con un centro toxicológico o tequila irving de urgencia si usted gabbie que haya tomado demasiado medicamento.  ATENCIÓN: Valdez medicamento es solo para usted. No comparta valdez medicamento con nadie.  ¿Qué sucede si me olvido de tequila dosis?  Si lily waterman medicamento tequila vez al día y olvida tequila dosis, tome la dosis que olvidó tan pronto faina lo recuerde. Si es miryam la hora de la próxima dosis, tome solo michael dosis. No tome dosis adicionales o dobles.  Si lily waterman medicamento dos veces al día y olvida tequila dosis, tome la dosis que olvidó de inmediato. En valdez sandee, se pueden nydia 2 tabletas juntas. El día siguiente, debe nydia 1 tableta dos veces al día, según las instrucciones.  ¿Qué puede interactuar con valdez  medicamento?  No tome valdez medicamento con ninguno de los siguientes fármacos:  defibrotida  Esta medicina también puede interactuar con los siguientes medicamentos:  aspirina y medicamentos tipo aspirina ciertos antibióticos, tales faina eritromicina, acitromicina y claritromicina ciertos medicamentos para infecciones micóticas, tales faina itraconazol y ketoconazol ciertos medicamentos para el pulso cardiaco irregular, tales faina amiodarona, quinidina, dronedarona ciertos medicamentos para convulsiones, tales faina carbamazepina, fenitoína ciertos medicamentos que tratan o previenen coágulos sanguíneos, faina warfarina, enoxaparina y dalteparina conivaptán felodipina indinavir lopinavir; ritonavir OSCAR, medicamentos para el dolor y la inflamación, faina ibuprofeno o naproxeno ranolazina rifampicina ritonavir IRSN, medicamentos para la depresión, tales faina desvenlafaxina, duloxetina, levomilnaciprán, venlafaxina ISRS, medicamentos para la depresión, tales faina citaloprám, escitaloprám, fluoxetina, fluvoxamina, paroxetina, sertralina hierba de [place] verapamilo  Puede ser que esta lista no menciona todas las posibles interacciones. Informe a waterman profesional de la nora de todos los productos a base de hierbas, medicamentos de venta rona o suplementos nutritivos que esté tomando. Si usted fuma, consume bebidas alcohólicas o si utiliza drogas ilegales, indíqueselo también a waterman profesional de la nora. Algunas sustancias pueden interactuar con waterman medicamento.  ¿A qué isabela estar atento al usar valdez medicamento?  Visite a waterman profesional de la nora para que revise regularmente waterman evolución. Usted podría necesitar realizarse análisis de priyank mientras esté usando valdez medicamento. Se supervisará waterman estado de nora atentamente mientras reciba valdez medicamento. Es importante no faltar a ninguna cecile.  Evite los deportes y actividades que podrían causar tequila lesión mientras esté usando valdez medicamento. Las lesiones o caídas  graves pueden causar un sangrado oculto. Tenga cuidado al usar herramientas filosas o cuchillos. Considere usar tequila afeitadora eléctrica. Tenga especial cuidado al cepillarse los dientes o usar hilo dental. Informe cualquier lesión, hematoma, o puntos rojos en la piel a waterman profesional de la nora.  Si va a someterse a tequila operación o a otro procedimiento, informe a waterman profesional de la nora que está tomando valdez medicamento.  Use un brazalete o tequila tucker de identificación médica. Lleve con usted tequila tarjeta que describa waterman enfermedad, detalles de waterman medicamento y horario de dosis.  ¿Qué efectos secundarios puedo tener al utilizar valdez medicamento?  Efectos secundarios que debe informar a waterman médico o a waterman profesional de la nora tan pronto faina sea posible:  reacciones alérgicas, faina erupción cutánea, comezón/picazón o urticaria, e hinchazón de la zina, los labios o la lengua dolor de espalda enrojecimiento, formación de ampollas, descamación o distensión de la piel, incluso dentro de la boca signos y síntomas de sangrado, tales faina heces con priyank o de color adair y aspecto alquitranado; orina de color arvizu o marrón oscuro; escupir priyank o material marrón que tiene el aspecto de granos de café molido; manchas ramsey en la piel; sangrado o moretones inusuales en los ojos, las encías o la nariz signos y síntomas de un coágulo sanguíneo, tales faina dolor en el pecho; falta de aire; dolor, hinchazón o calor en la pierna signos y síntomas de un accidente cerebrovascular, tales faina cambios en la visión; confusión; dificultad para hablar o entender; connie de erika severos; entumecimiento o debilidad repentina de la zina, el brazo o la pierna; problemas al caminar; mareo; pérdida de la coordinación  Efectos secundarios que generalmente no requieren atención médica (infórmelos a waterman médico o a waterman profesional de la nora si persisten o si son molestos):  mareos dolor muscular  Puede ser que esta lista no menciona  todos los posibles efectos secundarios. Comuníquese a waterman médico por asesoramiento médico sobre los efectos secundarios. [place] puede informar los efectos secundarios a la FDA por teléfono al 1-800-FDA-4579.  ¿Dónde isabela guardar mi medicina?  Manténgala fuera del alcance de los niños.  Guárdela a temperatura ambiente, entre 15 y 30 grados C (59 y 86 grados F). Deseche todo el medicamento que no haya utilizado, después de la fecha de vencimiento.  ATENCIÓN: Sharron folleto es un resumen. Puede ser que no cubra toda la posible información. Si usted tiene preguntas acerca de esta medicina, consulte con waterman médico, waterman farmacéutico o waterman profesional de la nora.  © 2020 Elsevier/Gold Standard (2019-11-25 00:00:00)      Prevención de infecciones en el hogar  Infection Prevention in the Home  Si tiene kerline infección, puede haberse expuesto a kerline infección o está cuidando a alguien que tiene kerline infección, es importante saber cómo evitar que la infección se propague. Siga las instrucciones del médico y missy uso de estas pautas para evitar la propagación de la infección.  Cómo se propagan las infecciones  Para que kerline infección se propague, debe existir lo siguiente:  · Un germen. Green puede ser un virus, kerline bacteria, un hongo o un parásito.  · Un lugar donde viva el germen. Green puede ser lo siguiente:  ? En kerline persona, animal, planta o alimento.  ? En el suelo o en el agua.  ? En superficies faina la manija de kerline amaury.  · Kerline persona o animal que pueda desarrollar kerline enfermedad si el germen ingresa en waterman cuerpo (anfitrión). El anfitrión no tiene resistencia al germen.  · Kerline manera de que el germen ingrese al anfitrión. Green puede ocurrir de las siguientes maneras:  ? Por contacto directo con kerline persona o animal infectado. Green puede suceder al darse la mano o abrazarse. Algunos gérmenes también pueden desplazarse a través del aire y propagarse a otras personas. Green puede ocurrir cuando kerline persona infectada tose o  estornuda sobre o cerca de otras personas.  ? Contacto indirecto. [place] sucede cuando el germen ingresa en el anfitrión a través del contacto con un objeto infectado. Por ejemplo:  § Ingerir o beber alimentos o agua que tengan el germen (estén contaminados).  § Al tocar tequila superficie contaminada con las roger y luego llevarse la mano a la zina, la boca, la nariz o los ojos.  Materiales necesarios:  · Jabón.  · Desinfectante para roger a base de alcohol.  · Productos de limpieza estándar.  · Desinfectantes, faina lejía.  · Toallas de papel o paños de limpieza o esponjas reutilizables.  · Guantes de trabajo desechables o reutilizables.  Cómo evitar que la infección se propague  Hay varias cosas que puede hacer para prevenir la propagación de la infección.  [place] estas medidas generales  Todas las personas deben nydia las siguientes medidas para evitar la propagación de la infección:  · Lávese las roger regularmente con agua y jabón blake al menos 20 segundos. Use desinfectante para roger con alcohol si no dispone de agua y jabón.  · Evitar tocarse la zina, la boca, la nariz y los ojos.  · Tosa o estornude en un pañuelo de papel o en waterman manga o codo en lugar de hacerlo en la mano o en el aire.  ? Si tose o estornuda en un pañuelo de papel, deséchelo inmediatamente y lávese las roger.    Mantenga el baño limpio  · Ponga jabón.  · Cambiar las toallas y las toallitas de mano con frecuencia.  · Cambiar los cepillos de dientes a menudo y guardarlos por separado en un lugar limpio y seco.  · Limpie y desinfecte todas las superficies; entre ellas, el inodoro, el piso, la bañera, la ducha y el lavabo.  · No comparta elementos personales, faina afeitadoras, cepillos de dientes, desodorantes, peines, cepillos, toallas y toallitas de mano.  Mantenga la higiene en la cocina    · Lávese las roger antes y después de preparar los alimentos y antes de comer.  · Limpiar el interior del refrigerador todas las semanas.  · Mantener el  refrigerador en 40 °F (4 °C) o menos y el congelador a tequila temperatura de 0 °F (-18 °C) o menos.  · Mantenga las superficies de trabajo limpias. Desinféctelas periódicamente.  · Sabiha la vajilla con [place] y jabonosa. Secar la vajilla al aire o usar un lavavajillas.  · No comparta platos ni utensilios para comer.  Manipule los alimentos de manera crandall.  · Almacene los alimentos cuidadosamente.  · Refrigere las sobras de inmediato en recipientes con tapa.  · Tire alimentos rancios o en mal estado.  · Descongele los alimentos en el refrigerador o el microondas, no a temperatura ambiente.  · Sirva los alimentos a la temperatura adecuada. No coma carne cruda. Asegúrese de cocinar la carne a la temperatura adecuada. Cocine los huevos hasta que estén firmes.  · Lave las frutas y las verduras debajo del agua corriente.  · Use tablas de maritza, platos y utensilios distintos para alimentos crudos y alimentos cocidos.  · Use tequila cuchara limpia cada vez que prueba la comida mientras cocina.  Lave la ropa de la manera correcta  · Use guantes si la ropa está visiblemente sucia.  · No sacuda la ropa sucia. Hacerlo puede esparcir los gérmenes por el aire.  · Lave la ropa con [place].  · Si no puede sabiha la ropa de inmediato, colóquela en tequila bolsa plástica y lávela lo antes posible.  Tenga cuidado con los animales y mascotas.  · Lávese las roger antes y después de tocar animales.  · Si tiene tequila mascota, asegúrese de mantenerla limpia. No permita que personas con un sistema inmunitario débil toquen excremento de pájaros, el agua de la pecera ni bandejas sanitarias.  ? Si tiene tequila jaula para mascotas o tequila caja de arena, asegúrese de limpiarla todos los días.  · Si está enfermo, manténgase alejado de los animales y pídale a otra persona que los cuide si es posible.  Cómo limpiar y desinfectar objetos y superficies  Precauciones  · Algunos desinfectantes funcionan con ciertos gérmenes y no con otros. Leti las  instrucciones del fabricante o rian los recursos en línea para determinar si el producto que está usando funcionará con el germen que usted trata de eliminar.  · Si elige emplear lejía, úsela de manera crandall. Nunca los mezcle con otros productos de limpieza, en especial, si contienen amoníaco. Esta mezcla puede producir un gas peligroso que puede ser mortal.  · Mantenga un movimiento adecuado de aire fresco en waterman casa (ventilación).  · Vierta el agua que usó para trapear en el lavabo de servicio o en el inodoro. No vierta michael agua en el fregadero de la cocina.  Objetos y superficies    · Si las superficies están visiblemente sucias, límpielas severiano con agua y jabón antes de desinfectarlas.  · Desinfecte las superficies que se tocan con frecuencia todos los días. Pueden incluir:  ? Encimeras.  ? Mesas.  ? Picaportes.  ? Lavabos, fregaderos y grifos.  ? Dispositivos electrónicos, faina:  § Teléfonos.  § Controles remotos.  § Teclados.  § Computadoras y tablets.  Suministros de limpieza  Algunos suministros de limpieza pueden cultivar gérmenes. Cuídelos elise para evitar que los gérmenes se propaguen. Para hacer esto:  · Remoje cepillos de inodoro, trapeadores y esponjas en lejía y agua blake 5 minutos después de cada uso, o según las instrucciones del fabricante.  · Lave los paños de limpieza reutilizables y desinfecte las esponjas después de cada uso.  · Deseche los guantes desechables después de un uso.  · Reemplace los guantes reutilizables si están rotos o rasgados o si se empiezan a pelar.  Medidas adicionales si está enfermo  Si vive con otras personas:    · Evite el contacto cercano con las personas que lo rodean. Permanezca a tequila distancia de al menos 3 pies (1 m) de las otras personas, si es posible.  · Use un baño aparte, si es posible.  · De ser posible, duerma en un dormitorio aparte o en tequila cama aparte para evitar infectar a otros miembros de la priscilla.  ? Cambiar la ropa daniela de los dormitorios  todas las semanas o cuando esté sucia.  · Romulo que todos los integrantes del hogar se laven las roger con agua y jabón con frecuencia. Use desinfectante para roger con alcohol si no dispone de agua y jabón.  En general:  · Quédese en waterman casa, excepto para obtener atención médica. Llame con anticipación antes de visitar al médico.  · Pídales a otras personas que le ethan la compra del supermercado y los suministros para el hogar, y que surtan las recetas de rick medicamentos.  · Evite las zonas públicas. Trate de no viajar en transporte público.  · Si puede, use tequila mascarilla si debe salir de la casa o si está en contacto cercano con alguien que no está enfermo.  · No reciba visitas hasta que se haya recuperado completamente o hasta que no tenga signos ni síntomas de infección.  · Evite preparar alimentos o cuidar a otras personas. Si debe preparar alimentos o cuidar a otras personas, use tequila mascarilla y lávese las roger antes y después de hacer estas cosas.  Dónde buscar más información  · Centers for Disease Control and Prevention (Centros para el Control y la Prevención de Enfermedades): www.cdc.gov/nonpharmaceutical-interventions/index.html.  · Organización [employer] de la [place] (OMS): www.who.int/infection-prevention/about/en/  · Association for Professionals in Infection Control and Epidemiology (Asociación de profesionales del control de infecciones y epidemiología): professionals.site.apic.org/settings-of-care/non-healthcare-setting/home/  Resumen  · Es importante saber cómo evitar que la infección se propague.  · Asegúrese de que en waterman hogar todos se laven las roger con agua y jabón con frecuencia.  · Desinfecte las superficies que se tocan con frecuencia todos los días.  · Si está enfermo, quédese en waterman casa, excepto para obtener atención médica.  Esta información no tiene faina fin reemplazar el consejo del médico. Asegúrese de hacerle al médico cualquier pregunta que tenga.  Document Released: 11/30/2009  Document Revised: 03/30/2020 Document Reviewed: 03/30/2020  Elsevier Patient Education © 2020 Elsevier Inc.        COVID-19  COVID-19  La COVID-19 es tequila infección respiratoria causada por un virus llamado coronavirus tipo 2 causante del síndrome respiratorio anoop grave (SARS-CoV-2). La enfermedad también se conoce faina enfermedad por coronavirus o nuevo coronavirus. En algunas personas, el virus puede no ocasionar síntomas. En otras, puede producir tequila infección grave. La infección puede empeorar rápidamente y causar complicaciones, faina:  · Neumonía o infección en los pulmones.  · Síndrome de dificultad respiratoria aguda o SDRA. Se trata de la acumulación de líquido en los pulmones.  · Insuficiencia respiratoria aguda. Se trata de tequila afección en la que no pasa suficiente oxígeno de los pulmones al cuerpo.  · Sepsis o choque séptico. Se trata de tequila reacción grave del cuerpo ante tequila infección.  · Problemas de coagulación.  · Infecciones secundarias debido a bacterias u hongos.  El virus que causa la COVID-19 es contagioso. [employer] significa que puede transmitirse de tequila persona a otra a través de las gotitas de saliva de la tos y de los estornudos (secreciones respiratorias).  ¿Cuáles son las causas?  Esta enfermedad es causada por un virus. Usted puede contagiarse con valdez virus:  · Al aspirar las gotitas que tequila persona infectada elimina al toser o estornudar.  · Al tocar algo, faina tequila gagnon o el picaportes de tequila amaury, que estuvo expuesto al virus (contaminado) y luego tocarse la boca, nariz o los ojos.  ¿Qué incrementa el riesgo?  Riesgo de infección  Es más probable que se infecte con valdez virus si:  · Vive o viaja a tequila ketty donde hay un brote de COVID-19.  · Entra en contacto con tequila persona enferma que recientemente viajó a tequila ketty con un brote de COVID-19.  · Cuida o vive con tequila persona infectada con COVID-19.  Riesgo de enfermedad grave  Es más probable que se enferme gravemente por el virus  si:  · Tiene 65 años o más.  · Tiene tequila enfermedad crónica que disminuye la capacidad del cuerpo para combatir las infecciones (immunocomprometido).  · Vive en un hogar de ancianos o centro de atención a jose plazo.  · Tiene tequila enfermedad prolongada (crónica), faina las siguientes:  ? Enfermedad pulmonar crónica, que incluye la enfermedad pulmonar obstructiva crónica o asma.  ? Enfermedad cardíaca.  ? Diabetes.  ? Enfermedad renal crónica.  ? Enfermedad hepática.  · Es perfecto.  ¿Cuáles son los signos o síntomas?  Los síntomas de esta afección pueden ser de leves a graves. Los síntomas pueden aparecer en el término de 2 a 14 días después de kitty estado expuesto al virus. Incluyen los siguientes:  · Fiebre.  · Tos.  · Dificultad para respirar.  · Escalofríos.  · Mahsa musculares.  · Dolor de garganta.  · Pérdida del gusto o el olfato.  Algunas personas también pueden tener problemas estomacales, faina náuseas, vómitos o diarrea.  Es posible que otras personas no tengan síntomas de COVID-19.  ¿Cómo se diagnostica?  Esta afección se puede diagnosticar en función de lo siguiente:  · Babita signos y síntomas, especialmente si:  ? Vive en tequila ketty donde hay un brote de COVID-19.  ? Viajó recientemente a tequila ketty donde el virus es frecuente.  ? Cuida o vive con tequila persona a quien se le diagnosticó COVID-19.  · Un examen físico.  · Análisis de laboratorio que pueden incluir:  ? Un hisopado nasal para nydia tequila muestra de líquido de la nariz.  ? Un hisopado de garganta para nydia tequila muestra de líquido de la garganta.  ? Tequila muestra de mucosidad de los pulmones (esputo).  ? Análisis de priyank.  · Los estudios de diagnóstico por imágenes pueden incluir radiografías, exploración por tomografía computarizada (TC) o ecografía.  ¿Cómo se trata?  En valdez momento, no hay ningún medicamento para tratar la COVID-19. Los medicamentos para tratar otras enfermedades se usan a modo de ensayo para comprobar si son eficaces contra la  COVID-19.  El médico le informará sobre las maneras de tratar los síntomas. En la mayoría de las personas, la infección es leve y puede controlarse en el hogar con reposo, líquidos y medicamentos de venta rona.  El tratamiento para tequila infección grave suele realizarse en la unidad de cuidados intensivos (UCI) de un hospital. Puede incluir davian o más de los siguientes. Estos tratamientos se administran hasta que los síntomas mejoran.  · Recibir líquidos y medicamentos a través de tequila vía intravenosa.  · Oxígeno complementario. Para administrar oxígeno extra, se utiliza un tubo en la nariz, tequila mascarilla o tequila petra de oxígeno.  · Colocarlo para que se recueste boca abajo (decúbito prono). [place] facilita el ingreso de oxígeno a los pulmones.  · Uso continuo de tequila máquina de presión positiva de las vías aéreas (CPAP) o de presión positiva de las vías aéreas de dos niveles (BPAP). Valdez tratamiento utiliza tequila presión de aire leve para mantener las vías respiratorias abiertas. Un tubo conectado a un motor administra oxígeno al cuerpo.  · Respirador. Valdez tratamiento mueve el aire dentro y fuera de los pulmones mediante el uso de un tubo que se coloca en la tráquea.  · Traqueostomía. En valdez procedimiento se hace un orificio en el meli para insertar un tubo de respiración.  · Oxigenación por membrana extracorpórea (OMEC). En valdez procedimiento, los pulmones tienen la posibilidad de recuperarse al asumir las funciones del corazón y los pulmones. Suministra oxígeno al cuerpo y elimina el dióxido de carbono.  Siga estas instrucciones en waterman casa:  Estilo de raul  · Si está enfermo, quédese en waterman casa, excepto para obtener atención médica. El médico le indicará cuánto tiempo debe quedarse en casa. Llame al médico antes de buscar atención médica.  · Romulo reposo en waterman casa faina se lo haya indicado el médico.  · No consuma ningún producto que contenga nicotina o tabaco, faina cigarrillos, cigarrillos electrónicos y tabaco de  mascar. Si necesita ayuda para dejar de fumar, consulte al médico.  · Retome rick actividades normales faina se lo haya indicado el médico. Pregúntele al médico qué actividades son seguras para usted.  Instrucciones generales  · Use los medicamentos de venta rona y los recetados solamente faina se lo haya indicado el médico.  · Nella suficiente líquido faina para mantener la orina de color amarillo pálido.  · Concurra a todas las visitas de seguimiento faina se lo haya indicado el médico. [place] es importante.  ¿Cómo se jamie?    No hay ninguna vacuna que ayude a prevenir la infección por la COVID-19. Sin embargo, hay medidas que puede nydia para protegerse y proteger a otras personas de valdez virus.  Para protegerse:   · No viaje a zonas donde la COVID-19 sea un riesgo. Las zonas donde se informa la presencia de la COVID-19 cambian con frecuencia. Para identificar las zonas de alto riesgo y las restricciones de viaje, consulte el sitio web de viajes de los Centers for Disease Control and Prevention (CDC) (Centros para el Control y la Prevención de Enfermedades): wwwnc.cdc.gov/travel/notices  · Si vive o debe viajar a tequila ketty donde COVID-19 es un riesgo, tome precauciones para evitar infecciones.  ? Aléjese de las personas enfermas.  ? Lávese las roger frecuentemente con agua y jabón blake 20 segundos. Use desinfectante para roger con alcohol si no dispone de agua y jabón.  ? Evite tocarse la boca, la zina, los ojos o la nariz.  ? Evite salir de waterman casa, siga las indicaciones de waterman estado y de las autoridades sanitarias locales.  ? Si debe salir de waterman casa, use un barbijo de juany o tequila mascarilla facial.  ? Desinfecte los objetos y las superficies que se tocan con frecuencia todos los días. Pueden incluir:  § Encimeras y mesas.  § Picaportes e interruptores de dhruv.  § Lavabos, fregaderos y grifos.  § Aparatos electrónicos tales faina teléfonos, controles remotos, teclados, computadoras y tabletas.  Cómo proteger a los  demás:  Si tiene síntomas de la COVID-19, tome medidas para evitar que el virus se propague a otras personas.  · Si gabbie que tiene tequila infección por la COVID-19, comuníquese de inmediato con waterman médico. Informe al equipo de atención médica que gabbie que puede tener tequila infección por la COVID-19.  · Quédese en waterman casa. Salga de waterman casa solo para buscar atención médica. No utilice el transporte público.  · No viaje mientras esté enfermo.  · Lávese las roger frecuentemente con agua y jabón blake 20 segundos. Usar desinfectante para roger con alcohol si no dispone de agua y jabón.  · Manténgase alejado de quienes vivan con usted. Permita que los miembros de la priscilla sanos cuiden a los niños y las mascotas, si es posible. Si tiene que cuidar a los niños o las mascotas, lávese las roger con frecuencia y use un barbijo. Si es posible, permanezca en waterman habitación, separado de los demás. Utilice un baño diferente.  · Asegúrese de que todas las personas que viven en waterman casa se laven elise las roger y con frecuencia.  · Tosa o estornude en un pañuelo de papel o sobre waterman manga o codo. No tosa o estornude al aire ni se cubra la boca o la nariz con la mano.  · Use un barbijo de juany o tequila mascarilla facial.  Dónde buscar más información  · Centers for Disease Control and Prevention (Centros para el Control y la Prevención de Enfermedades): www.cdc.gov/coronavirus/2019-ncov/index.html  · World Health Organization (Organización Mundial de la [place]): www.who.int/health-topics/coronavirus  Comuníquese con un médico si:  · Vive o ha viajado a tequila ketty donde la COVID-19 es un riesgo y tiene síntomas de infección.  · Ha tenido contacto con alguien que tiene COVID-19 y usted tiene síntomas de infección.  Solicite ayuda de inmediato si:  · Tiene dificultad para respirar.  · Siente dolor u opresión en el pecho.  · Experimenta confusión.  · Tiene las uñas de los dedos y los labios de color azulado.  · Tiene dificultad para  despertarse.  · Los síntomas empeoran.  Estos síntomas pueden representar un problema grave que constituye tequila emergencia. No espere a renee si los síntomas desaparecen. Solicite atención médica de inmediato. Comuníquese con el servicio de emergencias de waterman localidad (911 en los [place]). No conduzca por rick propios medios hasta el hospital. Informe al personal médico de emergencias si gabbie que tiene COVID-19.  Resumen  · La COVID-19 es tequila infección respiratoria causada por un virus. También se conoce faina enfermedad por coronavirus o nuevo coronavirus. Puede causar infecciones graves, faina neumonía, síndrome de dificultad respiratoria aguda, insuficiencia respiratoria aguda o sepsis.  · El virus que causa la COVID-19 es contagioso. [place] significa que puede transmitirse de tequila persona a otra a través de las gotitas de saliva de la tos y de los estornudos.  · Es más probable que desarrolle tequila enfermedad grave si tiene 65 años o más, tiene un sistema inmunitario débil, vive en un hogar de ancianos o tiene enfermedad crónica.  · No hay ningún medicamento para tratar la COVID-19. El médico le informará sobre las maneras de tratar los síntomas.  · [place] medidas para protegerse y proteger a los demás contra las infecciones. Lávese las roger con frecuencia y desinfecte los objetos y las superficies que se tocan con frecuencia todos los días. Manténgase alejado de las personas que estén enfermas y use un barbijo si está enfermo.  Esta información no tiene faina fin reemplazar el consejo del médico. Asegúrese de hacerle al médico cualquier pregunta que tenga.  Document Released: 02/15/2020 Document Revised: 05/19/2020 Document Reviewed: 02/15/2020  Elsevier Patient Education © 2020 [employer].    Embolia pulmonar  Pulmonary Embolism    Tequila embolia pulmonar (EP) es tequila obstrucción repentina o tequila disminución del flujo de priyank en davian o en ambos pulmones. La mayoría de las obstrucciones proviene de un coágulo de priyank  que se forma en tequila vena de tequila pierna, un muslo o un brazo (trombosis venosa profunda, TVP) y se traslada a los pulmones. Un coágulo es priyank que se ha espesado y convertido en gel o se ha solidificado. La EP es tequila afección peligrosa y potencialmente mortal que requiere tratamiento inmediato.  ¿Cuáles son las causas?  Esta afección generalmente es causada por un coágulo de priyank que se forma en tequila vena y se traslada a los pulmones. En algunos casos raros, puede causarla aire, grasa, parte de un tumor u otro tejido que se mueve a través de las venas hacia los pulmones.  ¿Qué incrementa el riesgo?  Los siguientes factores pueden hacer que sea más propenso a desarrollar esta afección:  · Tener tequila lesión traumática, faina fractura de cadera o pierna.  · Tener:  ? Lesión en la médula grewal.  ? Cirugía ortopédica, especialmente reemplazo de cadera o rodilla.  ? Cualquier cirugía mayor.  ? Un accidente cerebrovascular.  ? Trombosis venosa profunda (TVP).  ? Coágulos de priyank o enfermedad de coagulación de la priyank.  ? Tequila enfermedad cardíaca o pulmonar a jose plazo (crónica).  ? Tratamiento para el cáncer con quimioterapia.  ? Un catéter venoso central.  · [place] medicamentos que contengan estrógenos. Estos incluyen las píldoras anticonceptivas y la terapia de reemplazo hormonal.  · Tener las siguientes características:  ? Mujeres embarazadas.  ? Estar en el período de tiempo después del parto (posparto).  ? Mayores de 60 años.  ? Sobrepeso.  ? Ser fumador, especialmente si tiene otros riesgos.  ¿Cuáles son los signos o los síntomas?  Los síntomas de esta afección generalmente comienzan de manera repentina e incluyen lo siguiente:  · Falta de aire blake la actividad o en reposo.  · Tos o tos con priyank o expulsar mucosidad sanguinolenta al toser.  · Dolor de pecho que empeora al inspirar profundamente.  · Latidos cardíacos irregulares o rápidos.  · Sentirse aturdido o mareado.  · Desmayos.  · Sensación de  ansiedad.  · Fiebre.  · Sudoración.  · Dolor e hinchazón en tequila pierna. [place] es un síntoma de TVP, que puede desencadenar tequila EP.  ¿Cómo se diagnostica?  Esta afección se puede diagnosticar en función de lo siguiente:  · [place] antecedentes médicos.  · Un examen físico.  · Análisis de priyank.  · Angiograma pulmonar por tomografía computarizada. Esta prueba analiza el flujo sanguíneo dentro y alrededor de los pulmones.  · Gammagrafía pulmonar de ventilación y perfusión, también llamada exploración de VQ. Esta prueba mide el flujo de aire y el flujo de priyank que llega a los pulmones.  · Tequila ecografía de las piernas.  ¿Cómo se trata?  El tratamiento de esta afección depende de varios factores, tales faina la causa de waterman EP, el riesgo de hemorragia o de que se formen más coágulos, y cualquier otra afección que tenga. El tratamiento pretende quitar, disolver o detener la formación o el crecimiento de coágulos de priyank. El tratamiento puede incluir:  · Medicamentos, faina por ejemplo:  ? Medicamentos diluyentes de la priyank (anticoagulantes) para detener la formación de coágulos.  ? Medicamentos que disuelven los coágulos (trombolíticos).  · Procedimientos, por ejemplo:  ? Usar un tubo flexible para extraer un coágulo de priyank (embolectomía) o para administrar medicamentos para destruirlo (trombólisis dirigida por catéter).  ? Insertar un filtro en tequila vena mya que lleva priyank al corazón (vena cava inferior). [place] filtro (filtro de vena cava) atrapa los coágulos de priyank antes de que lleguen a los pulmones.  ? Cirugía para eliminar el coágulo (embolectomía quirúrgica). [place]o es poco frecuente.  Es posible que necesite un tratamiento inmediato, davian a jose plazo (hasta 3 meses después del diagnóstico) y davian prolongado (más de 3 meses después del diagnóstico). Waterman tratamiento puede continuar por varios meses (terapia de mantenimiento). Usted junto con waterman médico trabajarán para elegir el mejor programa de tratamiento para  usted.  Siga estas indicaciones en waterman casa:  Medicamentos  · [place] los medicamentos de venta rona y los recetados solamente faina se lo haya indicado el médico.  · Si lily un anticoagulante:  ? [place] el medicamento todos los días a la misma hora.  ? Sepa cuáles alimentos y fármacos interactúan con waterman medicamento.  ? Conozca los efectos secundarios de valdez medicamento, incluida la excesiva formación de moretones o sangrado. Consulte al médico o al farmacéutico acerca de otros efectos secundarios.  Indicaciones generales  · Use un brazalete de alerta médica o lleve tequila tarjeta de alerta médica que diga que ha tenido tequila EP y que enumere los medicamentos que usa.  · Pregúntele al médico cuándo podrá retomar rick actividades normales. Evite estar sentado o acostado blake períodos largos sin moverse.  · Mantenga un peso saludable. Pregunte a waterman médico cuál es un peso saludable para usted.  · No consuma ningún producto que contenga nicotina o tabaco, faina cigarrillos, cigarrillos electrónicos y tabaco de mascar. Si necesita ayuda para dejar de consumir, consulte al médico.  · Hable sobre rick planes de viaje con waterman médico. Es importante que se asegure de que aún podrá nydia waterman medicamento mientras esté de viaje.  · Concurra a todas las visitas de control faina se lo haya indicado el médico. [place] es importante.  Comuníquese con un médico si:  · Se salteó tequila dosis del anticoagulante.  Solicite ayuda inmediatamente si:  · Tiene los siguientes síntomas:  ? Dolor, hinchazón, calor o enrojecimiento nuevos en un brazo o tequila pierna o presenta un aumento de alguno de estos síntomas.  ? Entumecimiento u hormigueo en un brazo o tequila pierna.  ? Falta de aire blake la actividad o en reposo.  ? Fiebre.  ? Dolor en el pecho.  ? Latidos cardíacos irregulares o rápidos.  ? Dolor de erika intenso.  ? Cambios en la visión.  ? Tequila caída o un accidente grave, o se golpea la erika.  ? Dolor de estómago (abdominal).  ? [place] en el vómito, las  heces o la orina.  ? Un maritza que no emelina de sangrar.  · Tose y escupe priyank.  · Se siente aturdido o mareado.  · No puede  los brazos o las piernas.  · Se siente desorientado o pierde la memoria.  Estos síntomas pueden representar un problema grave que constituye tequila emergencia. No espere a renee si los síntomas desaparecen. Solicite atención médica de inmediato. Comuníquese con el servicio de emergencias de waterman localidad (911 en los [place]). No conduzca por rick propios medios hasta el hospital.  Resumen  · Tequila embolia pulmonar (EP) es tequila obstrucción repentina o tequila disminución del flujo de priyank en davian o en ambos pulmones. La EP es tequila afección peligrosa y potencialmente mortal que requiere tratamiento inmediato.  · Los tratamientos para esta afección generalmente incluyen medicamentos para diluir la priyank (anticoagulantes) o medicamentos para disolver los coágulos de priyank (trombolíticos).  · Si le dan anticoagulantes, es importante que tome el medicamento a la misma hora todos los días.  · Sepa cuáles alimentos y fármacos interactúan con los medicamentos que esté tomando.  · Comuníquese con el servicio de emergencias de waterman localidad (911 en los [place]) si presenta signos de EP o TVP.  Esta información no tiene faina fin reemplazar el consejo del médico. Asegúrese de hacerle al médico cualquier pregunta que tenga.  Document Released: 09/27/2006 Document Revised: 11/04/2019 Document Reviewed: 11/04/2019  Elsevier Patient Education © 2020 Elsevier Inc.

## 2022-01-03 LAB
BACTERIA BLD CULT: NORMAL
BACTERIA BLD CULT: NORMAL
SIGNIFICANT IND 70042: NORMAL
SIGNIFICANT IND 70042: NORMAL
SITE SITE: NORMAL
SITE SITE: NORMAL
SOURCE SOURCE: NORMAL
SOURCE SOURCE: NORMAL